# Patient Record
Sex: FEMALE | Race: WHITE
[De-identification: names, ages, dates, MRNs, and addresses within clinical notes are randomized per-mention and may not be internally consistent; named-entity substitution may affect disease eponyms.]

---

## 2017-05-16 ENCOUNTER — HOSPITAL ENCOUNTER (EMERGENCY)
Dept: HOSPITAL 14 - H.ER | Age: 22
Discharge: HOME | End: 2017-05-16
Payer: MEDICAID

## 2017-05-16 VITALS
RESPIRATION RATE: 16 BRPM | TEMPERATURE: 97.9 F | SYSTOLIC BLOOD PRESSURE: 119 MMHG | HEART RATE: 69 BPM | OXYGEN SATURATION: 100 % | DIASTOLIC BLOOD PRESSURE: 68 MMHG

## 2017-05-16 VITALS — BODY MASS INDEX: 24.7 KG/M2

## 2017-05-16 DIAGNOSIS — K29.70: Primary | ICD-10-CM

## 2017-05-16 DIAGNOSIS — N18.9: ICD-10-CM

## 2017-05-16 DIAGNOSIS — F41.9: ICD-10-CM

## 2017-05-16 DIAGNOSIS — R11.10: ICD-10-CM

## 2017-05-16 LAB
BACTERIA #/AREA URNS HPF: (no result) /[HPF]
BILIRUB UR-MCNC: NEGATIVE MG/DL
COLOR UR: (no result)
GLUCOSE UR STRIP-MCNC: (no result) MG/DL
KETONES UR STRIP-MCNC: NEGATIVE MG/DL
LEUKOCYTE ESTERASE UR-ACNC: (no result) LEU/UL
PH UR STRIP: 6 [PH] (ref 5–8)
PROT UR STRIP-MCNC: 30 MG/DL
RBC # UR STRIP: (no result) /UL
RBC #/AREA URNS HPF: 3 /HPF (ref 0–3)
SP GR UR STRIP: 1.02 (ref 1–1.03)
UROBILINOGEN UR-MCNC: (no result) MG/DL (ref 0.2–1)
WBC #/AREA URNS HPF: 10 /HPF (ref 0–5)

## 2017-07-04 ENCOUNTER — HOSPITAL ENCOUNTER (EMERGENCY)
Dept: HOSPITAL 31 - C.ER | Age: 22
Discharge: HOME | End: 2017-07-04
Payer: MEDICAID

## 2017-07-04 VITALS
SYSTOLIC BLOOD PRESSURE: 122 MMHG | DIASTOLIC BLOOD PRESSURE: 78 MMHG | HEART RATE: 72 BPM | TEMPERATURE: 98.5 F | RESPIRATION RATE: 20 BRPM

## 2017-07-04 VITALS — OXYGEN SATURATION: 100 %

## 2017-07-04 VITALS — BODY MASS INDEX: 24.7 KG/M2

## 2017-07-04 DIAGNOSIS — F31.9: ICD-10-CM

## 2017-07-04 DIAGNOSIS — F41.9: Primary | ICD-10-CM

## 2017-07-04 LAB
ALBUMIN SERPL-MCNC: 4.3 G/DL (ref 3.5–5)
ALBUMIN/GLOB SERPL: 1.3 {RATIO} (ref 1–2.1)
ALT SERPL-CCNC: 13 U/L (ref 9–52)
APAP SERPL-MCNC: < 10 UG/ML (ref 10–30)
AST SERPL-CCNC: 19 U/L (ref 14–36)
BACTERIA #/AREA URNS HPF: (no result) /[HPF]
BASOPHILS # BLD AUTO: 0 K/UL (ref 0–0.2)
BASOPHILS NFR BLD: 0.5 % (ref 0–2)
BILIRUB UR-MCNC: NEGATIVE MG/DL
BUN SERPL-MCNC: 8 MG/DL (ref 7–17)
CALCIUM SERPL-MCNC: 9.3 MG/DL (ref 8.6–10.4)
EOSINOPHIL # BLD AUTO: 0.2 K/UL (ref 0–0.7)
EOSINOPHIL NFR BLD: 1.8 % (ref 0–4)
ERYTHROCYTE [DISTWIDTH] IN BLOOD BY AUTOMATED COUNT: 12.6 % (ref 11.5–14.5)
GFR NON-AFRICAN AMERICAN: > 60
GLUCOSE UR STRIP-MCNC: NORMAL MG/DL
HCG,QUALITATIVE URINE: NEGATIVE
HGB BLD-MCNC: 12.9 G/DL (ref 11–16)
LEUKOCYTE ESTERASE UR-ACNC: (no result) LEU/UL
LYMPHOCYTES # BLD AUTO: 2.8 K/UL (ref 1–4.3)
LYMPHOCYTES NFR BLD AUTO: 31.2 % (ref 20–40)
MCH RBC QN AUTO: 28.6 PG (ref 27–31)
MCHC RBC AUTO-ENTMCNC: 33.1 G/DL (ref 33–37)
MCV RBC AUTO: 86.4 FL (ref 81–99)
MONOCYTES # BLD: 0.5 K/UL (ref 0–0.8)
MONOCYTES NFR BLD: 5 % (ref 0–10)
NEUTROPHILS # BLD: 5.5 K/UL (ref 1.8–7)
NEUTROPHILS NFR BLD AUTO: 61.5 % (ref 50–75)
NRBC BLD AUTO-RTO: 0.1 % (ref 0–2)
PH UR STRIP: 6 [PH] (ref 5–8)
PLATELET # BLD: 360 K/UL (ref 130–400)
PMV BLD AUTO: 8.9 FL (ref 7.2–11.7)
PROT UR STRIP-MCNC: (no result) MG/DL
RBC # BLD AUTO: 4.51 MIL/UL (ref 3.8–5.2)
RBC # UR STRIP: (no result) /UL
SALICYLATE: < 1 MG/DL 1
SP GR UR STRIP: 1.02 (ref 1–1.03)
SQUAMOUS EPITHIAL: 15 /HPF (ref 0–5)
URINE NITRATE: NEGATIVE
UROBILINOGEN UR-MCNC: NORMAL MG/DL (ref 0.2–1)
WBC # BLD AUTO: 9 K/UL (ref 4.8–10.8)

## 2017-07-04 NOTE — C.PDOC
History Of Present Illness


21 yr old female with history of bipolar disorder and has been off Litium for a 

while, presents to  the ER stating she is not able to sleep or eat for many 

days. Denies fever, chest pain, SOB, nausea, vomiting, headache, weakness or 

numbness. Denies suicidal ideation or homicidal ideation. 


Time Seen by Provider: 07/04/17 18:23


Chief Complaint (Nursing): Psychiatric Evaluation


History Per: Patient


History/Exam Limitations: no limitations


Onset/Duration Of Symptoms: Days (Many days)


Current Symptoms Are (Timing): Still Present


Associated Symptoms: denies: Suicidal Thoughts





Past Medical History


Reviewed: Historical Data, Nursing Documentation, Vital Signs


Vital Signs: 


 Last Vital Signs











Temp  98.2 F   07/04/17 18:04


 


Pulse  88   07/04/17 18:04


 


Resp  16   07/04/17 18:04


 


BP  131/82   07/04/17 18:04


 


Pulse Ox  100   07/04/17 18:41














- Medical History


PMH: Anxiety, Bipolar Disorder, Chronic Kidney Disease (hx of kidney infections)





- [facility] Procedures








NEBULIZER THERAPY (08/10/15)








Family History: States: No Known Family Hx





- Social History


Hx Tobacco Use: Yes


Hx Alcohol Use: No


Hx Substance Use: No





- Immunization History


Hx Tetanus Toxoid Vaccination: No


Hx Influenza Vaccination: No


Hx Pneumococcal Vaccination: No





Review Of Systems


Except As Marked, All Systems Reviewed And Found Negative.


Constitutional: Negative for: Fever


Cardiovascular: Negative for: Chest Pain


Respiratory: Negative for: Shortness of Breath


Gastrointestinal: Negative for: Nausea, Vomiting


Neurological: Negative for: Weakness, Numbness, Headache





Physical Exam





- Physical Exam


Appears: Non-toxic, Other ((+) Anxious by coopertive. )


Skin: Warm, Dry, No Rash


Head: Atraumatic, Normacephalic


Chest: Symmetrical, No Tenderness


Cardiovascular: Rhythm Regular, No Murmur


Respiratory: Normal Breath Sounds, No Stridor, No Wheezing


Extremity: Normal ROM, No Swelling


Neurological/Psych: Oriented x3, Normal Speech, Normal Motor





ED Course And Treatment





- Laboratory Results


Result Diagrams: 


 07/04/17 18:38





 07/04/17 18:38


Lab Interpretation: Abnormal (Few wbc's in urine)


Urine Pregnancy POC: Negative


O2 Sat by Pulse Oximetry: 100


Reevaluation Time: 20:15


Reassessment Condition: Improved





- Physician Consult Information


Outcome Of Conversation: 2015: d/w Crisis- ok to d/c and opt f/u. asks to Rx a 

few xanax 0.25 po for PRN use.  will see in AM





Medical Decision Making


Medical Decision Making: 


PLAN:


* Acetaminophen 


* Alcohol Serum 


* Drug Screen 


* CBC


* CMP


* HCG


* Urinalysis 








poorly controlled Bipolar, no suicidal ideation/HI





Disposition


Doctor Will See Patient In The: Office


Counseled Patient/Family Regarding: Studies Performed, Diagnosis





- Disposition


Disposition: HOME/ ROUTINE


Disposition Time: 20:15


Condition: GOOD





- Clinical Impression


Clinical Impression: 


 Anxiety, Manic bipolar I disorder








- Scribe Statement


The provider has reviewed the documentation as recorded by the Sushantibcarla Johnson


Provider Attestation: 


All medical record entries made by the Staci were at my direction and 

personally dictated by me. I have reviewed the chart and agree that the record 

accurately reflects my personal performance of the history, physical exam, 

medical decision making, and the department course for this patient. I have 

also personally directed, reviewed, and agree with the discharge instructions 

and disposition.

## 2017-09-10 ENCOUNTER — HOSPITAL ENCOUNTER (EMERGENCY)
Dept: HOSPITAL 14 - H.ER | Age: 22
Discharge: HOME | End: 2017-09-10
Payer: MEDICAID

## 2017-09-10 VITALS
HEART RATE: 66 BPM | RESPIRATION RATE: 18 BRPM | DIASTOLIC BLOOD PRESSURE: 77 MMHG | TEMPERATURE: 98.7 F | OXYGEN SATURATION: 100 % | SYSTOLIC BLOOD PRESSURE: 144 MMHG

## 2017-09-10 VITALS — BODY MASS INDEX: 24.7 KG/M2

## 2017-09-10 DIAGNOSIS — A63.0: Primary | ICD-10-CM

## 2017-10-02 ENCOUNTER — HOSPITAL ENCOUNTER (EMERGENCY)
Dept: HOSPITAL 14 - H.ER | Age: 22
Discharge: HOME | End: 2017-10-02
Payer: COMMERCIAL

## 2017-10-02 VITALS
RESPIRATION RATE: 18 BRPM | OXYGEN SATURATION: 100 % | TEMPERATURE: 98.4 F | DIASTOLIC BLOOD PRESSURE: 80 MMHG | SYSTOLIC BLOOD PRESSURE: 128 MMHG | HEART RATE: 68 BPM

## 2017-10-02 VITALS — BODY MASS INDEX: 24.7 KG/M2

## 2017-10-02 DIAGNOSIS — L25.9: Primary | ICD-10-CM

## 2019-05-23 ENCOUNTER — HOSPITAL ENCOUNTER (EMERGENCY)
Dept: HOSPITAL 14 - H.ER | Age: 24
Discharge: HOME | End: 2019-05-23
Payer: MEDICAID

## 2019-05-23 VITALS
SYSTOLIC BLOOD PRESSURE: 124 MMHG | OXYGEN SATURATION: 100 % | RESPIRATION RATE: 18 BRPM | TEMPERATURE: 99.1 F | HEART RATE: 77 BPM | DIASTOLIC BLOOD PRESSURE: 79 MMHG

## 2019-05-23 VITALS — BODY MASS INDEX: 24.7 KG/M2

## 2019-05-23 DIAGNOSIS — Z86.59: ICD-10-CM

## 2019-05-23 DIAGNOSIS — Z87.891: ICD-10-CM

## 2019-05-23 DIAGNOSIS — H10.9: Primary | ICD-10-CM

## 2019-05-23 DIAGNOSIS — N18.9: ICD-10-CM

## 2022-01-10 ENCOUNTER — NEW REFERRAL (OUTPATIENT)
Dept: URBAN - METROPOLITAN AREA CLINIC 73 | Facility: CLINIC | Age: 27
End: 2022-01-10

## 2022-01-10 DIAGNOSIS — H33.011: ICD-10-CM

## 2022-01-10 DIAGNOSIS — H52.13: ICD-10-CM

## 2022-01-10 DIAGNOSIS — H43.393: ICD-10-CM

## 2022-01-10 DIAGNOSIS — H35.462: ICD-10-CM

## 2022-01-10 PROCEDURE — 92134 CPTRZ OPH DX IMG PST SGM RTA: CPT

## 2022-01-10 PROCEDURE — 99244 OFF/OP CNSLTJ NEW/EST MOD 40: CPT

## 2022-01-10 PROCEDURE — 92201 OPSCPY EXTND RTA DRAW UNI/BI: CPT

## 2022-01-10 ASSESSMENT — VISUAL ACUITY
OS_CC: 20/25+2
OD_CC: 20/25+2

## 2022-01-10 ASSESSMENT — TONOMETRY
OS_IOP_MMHG: 18
OD_IOP_MMHG: 15

## 2022-01-11 ENCOUNTER — SURGERY/PROCEDURE (OUTPATIENT)
Dept: URBAN - METROPOLITAN AREA MEDICAL CENTER 2 | Facility: MEDICAL CENTER | Age: 27
End: 2022-01-11

## 2022-01-11 DIAGNOSIS — H33.021: ICD-10-CM

## 2022-01-11 PROCEDURE — 67107 REPAIR DETACHED RETINA: CPT

## 2022-01-12 ENCOUNTER — 1 DAY POST-OP (OUTPATIENT)
Dept: URBAN - METROPOLITAN AREA CLINIC 73 | Facility: CLINIC | Age: 27
End: 2022-01-12

## 2022-01-12 DIAGNOSIS — H33.011: ICD-10-CM

## 2022-01-12 DIAGNOSIS — H35.411: ICD-10-CM

## 2022-01-12 PROCEDURE — 99024 POSTOP FOLLOW-UP VISIT: CPT

## 2022-01-12 PROCEDURE — 92201 OPSCPY EXTND RTA DRAW UNI/BI: CPT

## 2022-01-12 ASSESSMENT — TONOMETRY: OD_IOP_MMHG: 20

## 2022-01-12 ASSESSMENT — VISUAL ACUITY
OD_CC: CF 6FT
OS_CC: 20/25

## 2022-01-19 ENCOUNTER — 1 WEEK POST-OP (OUTPATIENT)
Dept: URBAN - METROPOLITAN AREA CLINIC 73 | Facility: CLINIC | Age: 27
End: 2022-01-19

## 2022-01-19 DIAGNOSIS — H43.392: ICD-10-CM

## 2022-01-19 DIAGNOSIS — H35.462: ICD-10-CM

## 2022-01-19 DIAGNOSIS — H33.011: ICD-10-CM

## 2022-01-19 DIAGNOSIS — H35.411: ICD-10-CM

## 2022-01-19 PROCEDURE — 92201 OPSCPY EXTND RTA DRAW UNI/BI: CPT

## 2022-01-19 PROCEDURE — 99024 POSTOP FOLLOW-UP VISIT: CPT

## 2022-01-19 ASSESSMENT — VISUAL ACUITY
OD_SC: 5/200
OS_SC: 20/25

## 2022-01-19 ASSESSMENT — TONOMETRY: OD_IOP_MMHG: 12

## 2022-02-11 ENCOUNTER — POST-OP CHECK (OUTPATIENT)
Dept: URBAN - METROPOLITAN AREA CLINIC 73 | Facility: CLINIC | Age: 27
End: 2022-02-11

## 2022-02-11 DIAGNOSIS — T15.01XD: ICD-10-CM

## 2022-02-11 DIAGNOSIS — H33.011: ICD-10-CM

## 2022-02-11 DIAGNOSIS — H35.411: ICD-10-CM

## 2022-02-11 PROCEDURE — 65222 REMOVE FOREIGN BODY FROM EYE: CPT

## 2022-02-11 PROCEDURE — 99024 POSTOP FOLLOW-UP VISIT: CPT

## 2022-02-11 PROCEDURE — 92134 CPTRZ OPH DX IMG PST SGM RTA: CPT

## 2022-02-11 PROCEDURE — 92201 OPSCPY EXTND RTA DRAW UNI/BI: CPT

## 2022-02-11 ASSESSMENT — TONOMETRY
OS_IOP_MMHG: 23
OD_IOP_MMHG: 23
OS_IOP_MMHG: 24

## 2022-02-11 ASSESSMENT — VISUAL ACUITY
OD_SC: 5/200
OS_CC: 20/20-1

## 2022-08-01 ENCOUNTER — EMERGENCY (EMERGENCY)
Facility: HOSPITAL | Age: 27
LOS: 0 days | Discharge: HOME | End: 2022-08-01
Attending: EMERGENCY MEDICINE | Admitting: EMERGENCY MEDICINE

## 2022-08-01 VITALS
RESPIRATION RATE: 18 BRPM | TEMPERATURE: 97 F | HEIGHT: 62 IN | SYSTOLIC BLOOD PRESSURE: 137 MMHG | DIASTOLIC BLOOD PRESSURE: 71 MMHG | WEIGHT: 134.92 LBS | HEART RATE: 72 BPM | OXYGEN SATURATION: 100 %

## 2022-08-01 DIAGNOSIS — R10.30 LOWER ABDOMINAL PAIN, UNSPECIFIED: ICD-10-CM

## 2022-08-01 DIAGNOSIS — Z3A.09 9 WEEKS GESTATION OF PREGNANCY: ICD-10-CM

## 2022-08-01 DIAGNOSIS — O20.0 THREATENED ABORTION: ICD-10-CM

## 2022-08-01 DIAGNOSIS — O34.81 MATERNAL CARE FOR OTHER ABNORMALITIES OF PELVIC ORGANS, FIRST TRIMESTER: ICD-10-CM

## 2022-08-01 DIAGNOSIS — N83.11 CORPUS LUTEUM CYST OF RIGHT OVARY: ICD-10-CM

## 2022-08-01 DIAGNOSIS — O20.9 HEMORRHAGE IN EARLY PREGNANCY, UNSPECIFIED: ICD-10-CM

## 2022-08-01 LAB
ALBUMIN SERPL ELPH-MCNC: 4.7 G/DL — SIGNIFICANT CHANGE UP (ref 3.5–5.2)
ALP SERPL-CCNC: 50 U/L — SIGNIFICANT CHANGE UP (ref 30–115)
ALT FLD-CCNC: 8 U/L — SIGNIFICANT CHANGE UP (ref 0–41)
ANION GAP SERPL CALC-SCNC: 13 MMOL/L — SIGNIFICANT CHANGE UP (ref 7–14)
APPEARANCE UR: CLEAR — SIGNIFICANT CHANGE UP
AST SERPL-CCNC: 15 U/L — SIGNIFICANT CHANGE UP (ref 0–41)
BACTERIA # UR AUTO: ABNORMAL
BASOPHILS # BLD AUTO: 0.03 K/UL — SIGNIFICANT CHANGE UP (ref 0–0.2)
BASOPHILS NFR BLD AUTO: 0.3 % — SIGNIFICANT CHANGE UP (ref 0–1)
BILIRUB SERPL-MCNC: <0.2 MG/DL — SIGNIFICANT CHANGE UP (ref 0.2–1.2)
BILIRUB UR-MCNC: NEGATIVE — SIGNIFICANT CHANGE UP
BLD GP AB SCN SERPL QL: SIGNIFICANT CHANGE UP
BUN SERPL-MCNC: 10 MG/DL — SIGNIFICANT CHANGE UP (ref 10–20)
CALCIUM SERPL-MCNC: 9.7 MG/DL — SIGNIFICANT CHANGE UP (ref 8.5–10.1)
CHLORIDE SERPL-SCNC: 100 MMOL/L — SIGNIFICANT CHANGE UP (ref 98–110)
CO2 SERPL-SCNC: 24 MMOL/L — SIGNIFICANT CHANGE UP (ref 17–32)
COLOR SPEC: YELLOW — SIGNIFICANT CHANGE UP
CREAT SERPL-MCNC: 0.5 MG/DL — LOW (ref 0.7–1.5)
DIFF PNL FLD: ABNORMAL
EGFR: 133 ML/MIN/1.73M2 — SIGNIFICANT CHANGE UP
EOSINOPHIL # BLD AUTO: 0.16 K/UL — SIGNIFICANT CHANGE UP (ref 0–0.7)
EOSINOPHIL NFR BLD AUTO: 1.5 % — SIGNIFICANT CHANGE UP (ref 0–8)
EPI CELLS # UR: 3 /HPF — SIGNIFICANT CHANGE UP (ref 0–5)
GLUCOSE SERPL-MCNC: 81 MG/DL — SIGNIFICANT CHANGE UP (ref 70–99)
GLUCOSE UR QL: NEGATIVE — SIGNIFICANT CHANGE UP
HCG SERPL-ACNC: HIGH MIU/ML
HCT VFR BLD CALC: 35.5 % — LOW (ref 37–47)
HGB BLD-MCNC: 12 G/DL — SIGNIFICANT CHANGE UP (ref 12–16)
HYALINE CASTS # UR AUTO: 1 /LPF — SIGNIFICANT CHANGE UP (ref 0–7)
IMM GRANULOCYTES NFR BLD AUTO: 0.3 % — SIGNIFICANT CHANGE UP (ref 0.1–0.3)
INR BLD: 1.1 RATIO — SIGNIFICANT CHANGE UP (ref 0.65–1.3)
KETONES UR-MCNC: SIGNIFICANT CHANGE UP
LEUKOCYTE ESTERASE UR-ACNC: ABNORMAL
LYMPHOCYTES # BLD AUTO: 28.5 % — SIGNIFICANT CHANGE UP (ref 20.5–51.1)
LYMPHOCYTES # BLD AUTO: 3.12 K/UL — SIGNIFICANT CHANGE UP (ref 1.2–3.4)
MCHC RBC-ENTMCNC: 28.4 PG — SIGNIFICANT CHANGE UP (ref 27–31)
MCHC RBC-ENTMCNC: 33.8 G/DL — SIGNIFICANT CHANGE UP (ref 32–37)
MCV RBC AUTO: 83.9 FL — SIGNIFICANT CHANGE UP (ref 81–99)
MONOCYTES # BLD AUTO: 0.65 K/UL — HIGH (ref 0.1–0.6)
MONOCYTES NFR BLD AUTO: 5.9 % — SIGNIFICANT CHANGE UP (ref 1.7–9.3)
NEUTROPHILS # BLD AUTO: 6.95 K/UL — HIGH (ref 1.4–6.5)
NEUTROPHILS NFR BLD AUTO: 63.5 % — SIGNIFICANT CHANGE UP (ref 42.2–75.2)
NITRITE UR-MCNC: NEGATIVE — SIGNIFICANT CHANGE UP
NRBC # BLD: 0 /100 WBCS — SIGNIFICANT CHANGE UP (ref 0–0)
PH UR: 6 — SIGNIFICANT CHANGE UP (ref 5–8)
PLATELET # BLD AUTO: 367 K/UL — SIGNIFICANT CHANGE UP (ref 130–400)
POTASSIUM SERPL-MCNC: 4.2 MMOL/L — SIGNIFICANT CHANGE UP (ref 3.5–5)
POTASSIUM SERPL-SCNC: 4.2 MMOL/L — SIGNIFICANT CHANGE UP (ref 3.5–5)
PROT SERPL-MCNC: 7.4 G/DL — SIGNIFICANT CHANGE UP (ref 6–8)
PROT UR-MCNC: SIGNIFICANT CHANGE UP
PROTHROM AB SERPL-ACNC: 12.6 SEC — SIGNIFICANT CHANGE UP (ref 9.95–12.87)
RBC # BLD: 4.23 M/UL — SIGNIFICANT CHANGE UP (ref 4.2–5.4)
RBC # FLD: 13.9 % — SIGNIFICANT CHANGE UP (ref 11.5–14.5)
RBC CASTS # UR COMP ASSIST: 9 /HPF — HIGH (ref 0–4)
SODIUM SERPL-SCNC: 137 MMOL/L — SIGNIFICANT CHANGE UP (ref 135–146)
SP GR SPEC: 1.02 — SIGNIFICANT CHANGE UP (ref 1.01–1.03)
UROBILINOGEN FLD QL: SIGNIFICANT CHANGE UP
WBC # BLD: 10.94 K/UL — HIGH (ref 4.8–10.8)
WBC # FLD AUTO: 10.94 K/UL — HIGH (ref 4.8–10.8)
WBC UR QL: 37 /HPF — HIGH (ref 0–5)

## 2022-08-01 PROCEDURE — 99285 EMERGENCY DEPT VISIT HI MDM: CPT

## 2022-08-01 PROCEDURE — 76830 TRANSVAGINAL US NON-OB: CPT | Mod: 26

## 2022-08-01 PROCEDURE — 99283 EMERGENCY DEPT VISIT LOW MDM: CPT

## 2022-08-01 RX ORDER — CLINDAMYCIN PHOSPHATE GEL USP, 1% 10 MG/G
1 GEL TOPICAL
Qty: 7 | Refills: 0
Start: 2022-08-01 | End: 2022-08-07

## 2022-08-01 NOTE — CONSULT NOTE ADULT - ASSESSMENT
25yo  @9w5d with LMP  with vaginal spotting, now resolved, likely 2/2 to friable cervix with reassuring fetal and maternal status.    -no acute OBGYN intervention  -recommend follow-up with Dr. Hills next week at appointment  -recommend tylenol only for pain  -continue abx prescribed outpatient for UTI  -bleeding precautions given  -dispo per ED    INCOMPLETE NOTE 25yo  @9w5d with LMP  with vaginal spotting, now resolved, likely 2/2 to friable cervix with reassuring fetal and maternal status.    -no acute OBGYN intervention  -f/u vaginitis  -recommend follow-up with Dr. Hills next week at appointment  -recommend tylenol only for pain  -continue abx prescribed outpatient for UTI  -bleeding precautions given  -dispo per ED    Dr. Donato and Dr. Espinosa aware    Incomplete note 27yo  @9w5d with LMP  with vaginal spotting, now resolved, likely 2/2 to friable cervix with reassuring fetal and maternal status.    -no acute OBGYN intervention  -f/u vaginitis  -recommend cleocin vaginal cream for 7 days (sent to pharmacy)  -recommend follow-up with Dr. Hills next week at scheduled appointment  -recommend tylenol only for pain  -continue abx prescribed outpatient for UTI  -bleeding precautions given  -dispo per ED    Dr. Donato and Dr. Espinosa aware   25yo  @9w5d with LMP  with vaginal spotting, now resolved, likely 2/2 to friable cervix with reassuring fetal and maternal status.    -no acute OBGYN intervention  -historical type and screen on file - B pos, no need for rhogam  -f/u vaginitis  -recommend cleocin vaginal cream for 7 days (sent to pharmacy)  -recommend follow-up with Dr. Hills next week at scheduled appointment  -recommend tylenol only for pain  -continue abx prescribed outpatient for UTI  -bleeding precautions given  -dispo per ED    Dr. Donato and Dr. Espinosa aware

## 2022-08-01 NOTE — ED PROVIDER NOTE - OBJECTIVE STATEMENT
27 y/o F  @ 9 weeks pregnant presenting for vaginal bleeding that started suddenly overnight. A/w lower abdominal cramping that she states feels like contractions. States vaginal bleeding over past few hours was enough to soak through underwear and pants but has not used any pad/tampon. Did not take anything for pain. Pt has had ob visits, US confirmed intrauterine pregnancy. LMP . No recent fevers, nausea/vomiting, dysuria, back pain.

## 2022-08-01 NOTE — CONSULT NOTE ADULT - SUBJECTIVE AND OBJECTIVE BOX
PGY 2 Note    Chief Complaint: vaginal bleeding    HPI: 27yo  @9w5d with LMP  presents for vaginal bleeding since 1am. She denies saturating pads or passing clots. She also endorses cramping for the past 2 hours 3/10 intensity. Of note, she endorses having sexual intercourse with her  yesterday evening. patient is currently being treated with cephalexin for UTI by Dr. Hills (OBGYN). She denies back pain. Denies fevers, chills, SOB, CP, nausea, vomiting, diarrhea, constipation, or hematuria.     Ob/Gyn History:  , NSVDx2, FT, largest [id number], no complications      LMP - 22                  Cycle Length - regular  Endorses history of ovarian cysts, uterine fibroids. Denies abnormal paps or STIs    No Known Allergies    PAST MEDICAL & SURGICAL HISTORY: Denies      FAMILY HISTORY: denies pertinent      SOCIAL HISTORY: Denies cigarette use, alcohol use, or illicit drug use    Vital Signs Last 24 Hrs  T(F): 96.9 (01 Aug 2022 01:33), Max: 96.9 (01 Aug 2022 01:33)  HR: 72 (01 Aug 2022 01:33) (72 - 72)  BP: 137/71 (01 Aug 2022 01:33) (137/71 - 137/71)  RR: 18 (01 Aug 2022 01:33) (18 - 18)  Height (cm): 157.5 (22 @ 01:33)  Weight (kg): 61.2 (22 @ 01:33)  BMI (kg/m2): 24.7 (22 @ 01:33)  BSA (m2): 1.62 (22 @ 01:33)    General Appearance - AAOx3, NAD  Heart - S1S2 regular rate and rhythm  Lung - CTA Bilaterally  Abdomen - Soft, nontender, nondistended, no rebound, no rigidity, no guarding, bowel sounds present. No CVA tenderness.    GYN/Pelvis:    Labia Majora - Normal  Labia Minora - Normal  Clitoris - Normal  Urethra - Normal  Vagina - Normal. no bleeding  Cervix - Normal, closed, friable cervix noted    Uterus:  Size - Normal  Tenderness - None  Mass - None  Freely mobile    Adnexa:  Masses - None  Tenderness - None    LABS:                        12.0   10.94 )-----------( 367      ( 01 Aug 2022 05:12 )             35.5     HCG Quantitative, Serum: 17270.0 mIU/mL (22 @ 05:12)          137  |  100  |  10  ----------------------------<  81  4.2   |  24  |  0.5<L>    Ca    9.7      01 Aug 2022 05:12    TPro  7.4  /  Alb  4.7  /  TBili  <0.2  /  DBili  x   /  AST  15  /  ALT  8   /  AlkPhos  50      PT/INR - ( 01 Aug 2022 05:12 )   PT: 12.60 sec;   INR: 1.10 ratio           Urinalysis Basic - ( 01 Aug 2022 06:42 )    Color: Yellow / Appearance: Clear / S.024 / pH: x  Gluc: x / Ketone: Trace  / Bili: Negative / Urobili: <2 mg/dL   Blood: x / Protein: Trace / Nitrite: Negative   Leuk Esterase: Large / RBC: x / WBC x   Sq Epi: x / Non Sq Epi: x / Bacteria: x      RADIOLOGY & ADDITIONAL STUDIES:  < from: US Transvaginal (22 @ 06:00) >   US TRANSVAGINAL                          PROCEDURE DATE:  2022          INTERPRETATION:  CLINICAL INFORMATION: Vaginal bleeding in pregnancy.    LMP: 2022    COMPARISON: None.    TECHNIQUE:  Transabdominal pelvic sonogram only. Color and Spectral Doppler was   performed.    FINDINGS:    UTERUS: There is a single intrauterine gestational sac. A yolk sac is   identified. An embryo is present.     Mean sac diameter: 3.32 cm, corresponding to a gestational age of 8   weeks and 2 days.     Crown rump length: 2.13 cm, corresponding to a gestational age of 8   weeks and 5 days.     Embryonic heart rate: Regular rate and rhythm at 173 bpm.    RIGHT OVARY/ADNEXA: Ovarian size: 3.8 x 2.5 x 2.7 cm. Right corpus luteal   cyst.    LEFT OVARY/ADNEXA: Ovarian size: 3.0 x 1.0 x 3.2 cm. The left ovary is   unremarkable.    FLUID: There is no abnormal free fluid in the cul-de-sac.    IMPRESSION:    Single live intrauterine gestation at approximately 5 weeks and 5 days by    crown rump length.    Fetal heart rate of 173 bpm    --- End of Report ---    < end of copied text >  < from: US Transvaginal (22 @ 06:00) >   US TRANSVAGINAL                          PROCEDURE DATE:  2022          INTERPRETATION:  CLINICAL INFORMATION: Vaginal bleeding in pregnancy.    LMP: 2022    COMPARISON: None.    TECHNIQUE:  Transabdominal pelvic sonogram only. Color and Spectral Doppler was   performed.    FINDINGS:    UTERUS: There is a single intrauterine gestational sac. A yolk sac is   identified. An embryo is present.     Mean sac diameter: 3.32 cm, corresponding to a gestational age of 8   weeks and 2 days.     Crown rump length: 2.13 cm, corresponding to a gestational age of 8   weeks and 5 days.     Embryonic heart rate: Regular rate and rhythm at 173 bpm.    RIGHT OVARY/ADNEXA: Ovarian size: 3.8 x 2.5 x 2.7 cm. Right corpus luteal   cyst.    LEFT OVARY/ADNEXA: Ovarian size: 3.0 x 1.0 x 3.2 cm. The left ovary is   unremarkable.    FLUID: There is no abnormal free fluid in the cul-de-sac.    IMPRESSION:    Single live intrauterine gestation at approximately 5 weeks and 5 days by    crown rump length.    Fetal heart rate of 173 bpm    --- End of Report ---    < end of copied text >  < from: US Transvaginal (22 @ 06:00) >   US TRANSVAGINAL                          PROCEDURE DATE:  2022          INTERPRETATION:  CLINICAL INFORMATION: Vaginal bleeding in pregnancy.    LMP: 2022    COMPARISON: None.    TECHNIQUE:  Transabdominal pelvic sonogram only. Color and Spectral Doppler was   performed.    FINDINGS:    UTERUS: There is a single intrauterine gestational sac. A yolk sac is   identified. An embryo is present.     Mean sac diameter: 3.32 cm, corresponding to a gestational age of 8   weeks and 2 days.     Crown rump length: 2.13 cm, corresponding to a gestational age of 8   weeks and 5 days.     Embryonic heart rate: Regular rate and rhythm at 173 bpm.    RIGHT OVARY/ADNEXA: Ovarian size: 3.8 x 2.5 x 2.7 cm. Right corpus luteal   cyst.    LEFT OVARY/ADNEXA: Ovarian size: 3.0 x 1.0 x 3.2 cm. The left ovary is   unremarkable.    FLUID: There is no abnormal free fluid in the cul-de-sac.    IMPRESSION:    Single live intrauterine gestation at approximately 5 weeks and 5 days by    crown rump length.    Fetal heart rate of 173 bpm    --- End of Report ---    < end of copied text >     PGY 2 Note    Chief Complaint: vaginal bleeding    HPI: 27yo  @9w5d with LMP  presents for vaginal bleeding since 1am. She denies saturating pads or passing clots. She also endorses cramping for the past 2 hours 3/10 intensity. Of note, she endorses having sexual intercourse with her  yesterday evening. patient is currently being treated with cephalexin for UTI by Dr. Hills (OBGYN). She denies back pain. Denies fevers, chills, SOB, CP, nausea, vomiting, diarrhea, constipation, or hematuria.     Ob/Gyn History:  , NSVDx2, FT, largest [id number], no complications      LMP - 22                  Cycle Length - regular  Endorses history of ovarian cysts, uterine fibroids. Denies abnormal paps or STIs    No Known Allergies    PAST MEDICAL & SURGICAL HISTORY: Denies      FAMILY HISTORY: denies pertinent      SOCIAL HISTORY: Denies cigarette use, alcohol use, or illicit drug use    Vital Signs Last 24 Hrs  T(F): 96.9 (01 Aug 2022 01:33), Max: 96.9 (01 Aug 2022 01:33)  HR: 72 (01 Aug 2022 01:33) (72 - 72)  BP: 137/71 (01 Aug 2022 01:33) (137/71 - 137/71)  RR: 18 (01 Aug 2022 01:33) (18 - 18)  Height (cm): 157.5 (22 @ 01:33)  Weight (kg): 61.2 (22 @ 01:33)  BMI (kg/m2): 24.7 (22 @ 01:33)  BSA (m2): 1.62 (22 @ 01:33)    General Appearance - AAOx3, NAD  Heart - S1S2 regular rate and rhythm  Lung - CTA Bilaterally  Abdomen - Soft, nontender, nondistended, no rebound, no rigidity, no guarding, bowel sounds present. No CVA tenderness.  External genitalia: normal female genitalia  Vagina: rugaeted, no bleeding, no discharge  Cervix: Normal, closed, friable cervix noted  Uterus: 9 week sized, nontender, mobile  Adnexa: no masses, no tenderness    LABS:                        12.0   10.94 )-----------( 367      ( 01 Aug 2022 05:12 )             35.5     HCG Quantitative, Serum: 96055.0 mIU/mL (22 @ 05:12)          137  |  100  |  10  ----------------------------<  81  4.2   |  24  |  0.5<L>    Ca    9.7      01 Aug 2022 05:12    TPro  7.4  /  Alb  4.7  /  TBili  <0.2  /  DBili  x   /  AST  15  /  ALT  8   /  AlkPhos  50      PT/INR - ( 01 Aug 2022 05:12 )   PT: 12.60 sec;   INR: 1.10 ratio        Urinalysis Basic - ( 01 Aug 2022 06:42 )    Color: Yellow / Appearance: Clear / S.024 / pH: x  Gluc: x / Ketone: Trace  / Bili: Negative / Urobili: <2 mg/dL   Blood: x / Protein: Trace / Nitrite: Negative   Leuk Esterase: Large / RBC: x / WBC x   Sq Epi: x / Non Sq Epi: x / Bacteria: x      RADIOLOGY & ADDITIONAL STUDIES:  < from: US Transvaginal (22 @ 06:00) >   US TRANSVAGINAL                            INTERPRETATION:  CLINICAL INFORMATION: Vaginal bleeding in pregnancy.    LMP: 2022    COMPARISON: None.    TECHNIQUE:  Transabdominal pelvic sonogram only. Color and Spectral Doppler was   performed.    FINDINGS:    UTERUS: There is a single intrauterine gestational sac. A yolk sac is   identified. An embryo is present.     Mean sac diameter: 3.32 cm, corresponding to a gestational age of 8   weeks and 2 days.     Crown rump length: 2.13 cm, corresponding to a gestational age of 8   weeks and 5 days.     Embryonic heart rate: Regular rate and rhythm at 173 bpm.    RIGHT OVARY/ADNEXA: Ovarian size: 3.8 x 2.5 x 2.7 cm. Right corpus luteal   cyst.    LEFT OVARY/ADNEXA: Ovarian size: 3.0 x 1.0 x 3.2 cm. The left ovary is   unremarkable.    FLUID: There is no abnormal free fluid in the cul-de-sac.    IMPRESSION:    Single live intrauterine gestation at approximately 5 weeks and 5 days by    crown rump length.    Fetal heart rate of 173 bpm    --- End of Report ---    < end of copied text >

## 2022-08-01 NOTE — ED PROVIDER NOTE - NSFOLLOWUPINSTRUCTIONS_ED_ALL_ED_FT
Threatened Miscarriage    A threatened miscarriage occurs when you have vaginal bleeding during your first 20 weeks of pregnancy but the pregnancy has not ended. If you have vaginal bleeding during this time, your health care provider will do tests to make sure you are still pregnant. If the tests show you are still pregnant and the developing baby (fetus) inside your womb (uterus) is still growing, your condition is considered a threatened miscarriage.    A threatened miscarriage does not mean your pregnancy will end, but it does increase the risk of losing your pregnancy (complete miscarriage).    CAUSES  The cause of a threatened miscarriage is usually not known. If you go on to have a complete miscarriage, the most common cause is an abnormal number of chromosomes in the developing baby. Chromosomes are the structures inside cells that hold all your genetic material.    Some causes of vaginal bleeding that do not result in miscarriage include:    Having sex.  Having an infection.  Normal hormone changes of pregnancy.  Bleeding that occurs when an egg implants in your uterus.    RISK FACTORS  Risk factors for bleeding in early pregnancy include:    Obesity.  Smoking.  Drinking excessive amounts of alcohol or caffeine.  Recreational drug use.    SIGNS AND SYMPTOMS  Light vaginal bleeding.   Mild abdominal pain or cramps.     DIAGNOSIS  If you have bleeding with or without abdominal pain before 20 weeks of pregnancy, your health care provider will do tests to check whether you are still pregnant. One important test involves using sound waves and a computer (ultrasound) to create images of the inside of your uterus. Other tests include an internal exam of your vagina and uterus (pelvic exam) and measurement of your baby's heart rate.     You may be diagnosed with a threatened miscarriage if:    Ultrasound testing shows you are still pregnant.  Your baby's heart rate is strong.  A pelvic exam shows that the opening between your uterus and your vagina (cervix) is closed.  Your heart rate and blood pressure are stable.  Blood tests confirm you are still pregnant.    TREATMENT  No treatments have been shown to prevent a threatened miscarriage from going on to a complete miscarriage. However, the right home care is important.     HOME CARE INSTRUCTIONS  Make sure you keep all your appointments for prenatal care. This is very important.  Get plenty of rest.  Do not have sex or use tampons if you have vaginal bleeding.  Do not douche.  Do not smoke or use recreational drugs.   Do not drink alcohol.   Avoid caffeine.     SEEK MEDICAL CARE IF:  You have light vaginal bleeding or spotting while pregnant.   You have abdominal pain or cramping.   You have a fever.     SEEK IMMEDIATE MEDICAL CARE IF:  You have heavy vaginal bleeding.   You have blood clots coming from your vagina.   You have severe low back pain or abdominal cramps.   You have fever, chills, and severe abdominal pain.     MAKE SURE YOU:  Understand these instructions.  Will watch your condition.  Will get help right away if you are not doing well or get worse.    ADDITIONAL NOTES AND INSTRUCTIONS    Please follow up with your Primary MD in 24-48 hr.  Seek immediate medical care for any new/worsening signs or symptoms.    Urinary Tract Infection    A urinary tract infection (UTI) is an infection of any part of the urinary tract, which includes the kidneys, ureters, bladder, and urethra. Risk factors include ignoring your need to urinate, wiping back to front if female, being an uncircumcised male, and having diabetes or a weak immune system. Symptoms include frequent urination, pain or burning with urination, foul smelling urine, cloudy urine, pain in the lower abdomen, blood in the urine, and fever. If you were prescribed an antibiotic medicine, take it as told by your health care provider. Do not stop taking the antibiotic even if you start to feel better.    SEEK IMMEDIATE MEDICAL CARE IF YOU HAVE ANY OF THE FOLLOWING SYMPTOMS: severe back or abdominal pain, fever, inability to keep fluids or medicine down, dizziness/lightheadedness, or a change in mental status.    Please follow up with your OBGYN for your scheduled appointment.

## 2022-08-01 NOTE — ED ADULT NURSE NOTE - OBJECTIVE STATEMENT
Patient presents for vaginal bleeding and abdominal cramping.  States that she is 9 weeks pregnant.  Reports she has two previous live births.

## 2022-08-01 NOTE — ED PROVIDER NOTE - ATTENDING CONTRIBUTION TO CARE
Patient is c/o vaginal bleeding with lower abd cramping. Denies f/c/cp/sob. Patient is pregnant.   Vitals reviewed.   Patient is awake, alert, answering questions appropriately, appears comfortable and not in any distress.  Lungs: CTA, no wheezing, no crackles.  Abd: +BS, NT, ND, soft,   A/P: Vaginal bleeding/pelvic pain,   labs, US, OBGYN consult,   reevaluation.

## 2022-08-01 NOTE — ED PROVIDER NOTE - NS ED ROS FT
Constitutional: No fever  Eyes:  No visual changes, eye pain, or discharge.  ENMT:  No hearing changes, earpain, sore throat, runny nose, or difficulty swallowing  Cardiac:  No chest pain, SOB or edema. No chest pain with exertion.  Respiratory:  No cough or respiratory distress.   GI: +abd pain. No nausea, vomiting, diarrhea   : +vaginal bleeding. No dysuria, frequency or burning.  MS:  No myalgia, muscle weakness, joint pain or back pain.  Neuro:  No headache or weakness.  No LOC.  Skin:  No skin rash.

## 2022-08-01 NOTE — ED PROVIDER NOTE - PATIENT PORTAL LINK FT
You can access the FollowMyHealth Patient Portal offered by [facility] by registering at the following website: [email or web address]. By joining [facility]’s FollowMyHealth portal, you will also be able to view your health information using other applications (apps) compatible with our system.

## 2022-08-01 NOTE — ED PROVIDER NOTE - PHYSICAL EXAMINATION
CONSTITUTIONAL: Well-developed; well-nourished; in no acute distress.   SKIN: Warm, dry  HEAD: Normocephalic; atraumatic  EYES: PERRL, EOMI, normal sclera and conjunctiva   ENT: No nasal discharge; airway clear. MMM  NECK: Supple; non tender.  CARD:  Regular rate and rhythm. Normal S1, S2  RESP: No increased WOB. CTA b/l without wheezes, crackles, rhonchi  ABD: Normoactive BS. Soft, nondistended. Minimal suprapubic tenderness. No CVA tenderness.  EXT: Normal ROM. No edema.  LYMPH: No acute cervical adenopathy.  NEURO: Alert, oriented, grossly unremarkable  PSYCH: Cooperative, appropriate.

## 2022-08-01 NOTE — ED PROVIDER NOTE - TOBACCO USE
Detail Level: Zone Never smoker Detail Level: Simple Detail Level: Generalized Detail Level: Detailed

## 2022-08-01 NOTE — ED PROVIDER NOTE - PROGRESS NOTE DETAILS
BK: Patient RH negative on chart review, discussed with OB, no need for rhogam. Patient has been on cephalexin for ~1 week, discussed possibility of starting new antibiotics today, but patient would rather continue with current antibiotics and wait for urine culture. Patient has follow up with her OB in 1 week and would prefer to see her outpatient. Discharge precautions given.

## 2022-08-01 NOTE — ED ADULT TRIAGE NOTE - CHIEF COMPLAINT QUOTE
I'm pregnant, 9 weeks and I'm bleeding , spotting. Now I'm starting to feel cramping  - patient  Patient's third pregnancy

## 2022-08-02 LAB
CULTURE RESULTS: SIGNIFICANT CHANGE UP
SPECIMEN SOURCE: SIGNIFICANT CHANGE UP

## 2022-08-04 LAB
A VAGINAE DNA VAG QL NAA+PROBE: SIGNIFICANT CHANGE UP
BVAB2 DNA VAG QL NAA+PROBE: SIGNIFICANT CHANGE UP
C ALBICANS DNA VAG QL NAA+PROBE: NEGATIVE — SIGNIFICANT CHANGE UP
C GLABRATA DNA VAG QL NAA+PROBE: NEGATIVE — SIGNIFICANT CHANGE UP
C KRUSEI DNA VAG QL NAA+PROBE: NEGATIVE — SIGNIFICANT CHANGE UP
C LUSITANIAE DNA VAG QL NAA+PROBE: NEGATIVE — SIGNIFICANT CHANGE UP
C TRACH RRNA SPEC QL NAA+PROBE: NEGATIVE — SIGNIFICANT CHANGE UP
MEGA1 DNA VAG QL NAA+PROBE: SIGNIFICANT CHANGE UP
N GONORRHOEA RRNA SPEC QL NAA+PROBE: NEGATIVE — SIGNIFICANT CHANGE UP
T VAGINALIS RRNA SPEC QL NAA+PROBE: NEGATIVE — SIGNIFICANT CHANGE UP

## 2022-10-19 NOTE — ED ADULT NURSE NOTE - NSFALLRSKASSESASSIST_ED_ALL_ED
5-Fu Pregnancy And Lactation Text: This medication is Pregnancy Category X and contraindicated in pregnancy and in women who may become pregnant. It is unknown if this medication is excreted in breast milk. no

## 2023-08-31 ENCOUNTER — INPATIENT (INPATIENT)
Facility: HOSPITAL | Age: 28
LOS: 1 days | Discharge: HOME | DRG: 540 | End: 2023-09-02
Attending: OBSTETRICS & GYNECOLOGY | Admitting: OBSTETRICS & GYNECOLOGY
Payer: MEDICAID

## 2023-08-31 VITALS
RESPIRATION RATE: 18 BRPM | WEIGHT: 293 LBS | TEMPERATURE: 98 F | HEART RATE: 77 BPM | SYSTOLIC BLOOD PRESSURE: 120 MMHG | HEIGHT: 63 IN | DIASTOLIC BLOOD PRESSURE: 65 MMHG

## 2023-08-31 DIAGNOSIS — Z98.890 OTHER SPECIFIED POSTPROCEDURAL STATES: Chronic | ICD-10-CM

## 2023-08-31 PROBLEM — Z78.9 OTHER SPECIFIED HEALTH STATUS: Chronic | Status: ACTIVE | Noted: 2022-08-01

## 2023-08-31 LAB
AMPHET UR-MCNC: NEGATIVE — SIGNIFICANT CHANGE UP
APPEARANCE UR: ABNORMAL
APTT BLD: 23.6 SEC — CRITICAL LOW (ref 27–39.2)
BACTERIA # UR AUTO: ABNORMAL /HPF
BARBITURATES UR SCN-MCNC: NEGATIVE — SIGNIFICANT CHANGE UP
BASOPHILS # BLD AUTO: 0 K/UL — SIGNIFICANT CHANGE UP (ref 0–0.2)
BASOPHILS # BLD AUTO: 0.02 K/UL — SIGNIFICANT CHANGE UP (ref 0–0.2)
BASOPHILS NFR BLD AUTO: 0 % — SIGNIFICANT CHANGE UP (ref 0–1)
BASOPHILS NFR BLD AUTO: 0.2 % — SIGNIFICANT CHANGE UP (ref 0–1)
BENZODIAZ UR-MCNC: NEGATIVE — SIGNIFICANT CHANGE UP
BILIRUB UR-MCNC: ABNORMAL
BLD GP AB SCN SERPL QL: SIGNIFICANT CHANGE UP
BUPRENORPHINE SCREEN, URINE RESULT: NEGATIVE — SIGNIFICANT CHANGE UP
CAST: 1 /LPF — SIGNIFICANT CHANGE UP (ref 0–4)
COCAINE METAB.OTHER UR-MCNC: NEGATIVE — SIGNIFICANT CHANGE UP
COLOR SPEC: ABNORMAL
DIFF PNL FLD: NEGATIVE — SIGNIFICANT CHANGE UP
EOSINOPHIL # BLD AUTO: 0 K/UL — SIGNIFICANT CHANGE UP (ref 0–0.7)
EOSINOPHIL # BLD AUTO: 0.11 K/UL — SIGNIFICANT CHANGE UP (ref 0–0.7)
EOSINOPHIL NFR BLD AUTO: 0 % — SIGNIFICANT CHANGE UP (ref 0–8)
EOSINOPHIL NFR BLD AUTO: 1.1 % — SIGNIFICANT CHANGE UP (ref 0–8)
FENTANYL UR QL: NEGATIVE — SIGNIFICANT CHANGE UP
FIBRINOGEN PPP-MCNC: 461 MG/DL — SIGNIFICANT CHANGE UP (ref 204.4–570.6)
GLUCOSE UR QL: NEGATIVE MG/DL — SIGNIFICANT CHANGE UP
HCT VFR BLD CALC: 39.5 % — SIGNIFICANT CHANGE UP (ref 37–47)
HCT VFR BLD CALC: 40.3 % — SIGNIFICANT CHANGE UP (ref 37–47)
HGB BLD-MCNC: 13.6 G/DL — SIGNIFICANT CHANGE UP (ref 12–16)
HGB BLD-MCNC: 13.6 G/DL — SIGNIFICANT CHANGE UP (ref 12–16)
HIV 1 & 2 AB SERPL IA.RAPID: SIGNIFICANT CHANGE UP
HYALINE CASTS # UR AUTO: 1 /LPF — SIGNIFICANT CHANGE UP (ref 0–4)
IMM GRANULOCYTES NFR BLD AUTO: 0.3 % — SIGNIFICANT CHANGE UP (ref 0.1–0.3)
INR BLD: 0.91 RATIO — SIGNIFICANT CHANGE UP (ref 0.65–1.3)
KETONES UR-MCNC: 15 MG/DL
L&D DRUG SCREEN, URINE: SIGNIFICANT CHANGE UP
LEUKOCYTE ESTERASE UR-ACNC: ABNORMAL
LYMPHOCYTES # BLD AUTO: 0.79 K/UL — LOW (ref 1.2–3.4)
LYMPHOCYTES # BLD AUTO: 2.41 K/UL — SIGNIFICANT CHANGE UP (ref 1.2–3.4)
LYMPHOCYTES # BLD AUTO: 23 % — SIGNIFICANT CHANGE UP (ref 20.5–51.1)
LYMPHOCYTES # BLD AUTO: 3.5 % — LOW (ref 20.5–51.1)
MANUAL SMEAR VERIFICATION: SIGNIFICANT CHANGE UP
MCHC RBC-ENTMCNC: 30.2 PG — SIGNIFICANT CHANGE UP (ref 27–31)
MCHC RBC-ENTMCNC: 30.6 PG — SIGNIFICANT CHANGE UP (ref 27–31)
MCHC RBC-ENTMCNC: 33.7 G/DL — SIGNIFICANT CHANGE UP (ref 32–37)
MCHC RBC-ENTMCNC: 34.4 G/DL — SIGNIFICANT CHANGE UP (ref 32–37)
MCV RBC AUTO: 89 FL — SIGNIFICANT CHANGE UP (ref 81–99)
MCV RBC AUTO: 89.4 FL — SIGNIFICANT CHANGE UP (ref 81–99)
METHADONE UR-MCNC: NEGATIVE — SIGNIFICANT CHANGE UP
MONOCYTES # BLD AUTO: 0.2 K/UL — SIGNIFICANT CHANGE UP (ref 0.1–0.6)
MONOCYTES # BLD AUTO: 0.61 K/UL — HIGH (ref 0.1–0.6)
MONOCYTES NFR BLD AUTO: 0.9 % — LOW (ref 1.7–9.3)
MONOCYTES NFR BLD AUTO: 5.8 % — SIGNIFICANT CHANGE UP (ref 1.7–9.3)
NEUTROPHILS # BLD AUTO: 21 K/UL — HIGH (ref 1.4–6.5)
NEUTROPHILS # BLD AUTO: 7.28 K/UL — HIGH (ref 1.4–6.5)
NEUTROPHILS NFR BLD AUTO: 69.6 % — SIGNIFICANT CHANGE UP (ref 42.2–75.2)
NEUTROPHILS NFR BLD AUTO: 90.4 % — HIGH (ref 42.2–75.2)
NEUTS BAND # BLD: 2.6 % — SIGNIFICANT CHANGE UP (ref 0–6)
NITRITE UR-MCNC: POSITIVE
NRBC # BLD: 0 /100 WBCS — SIGNIFICANT CHANGE UP (ref 0–0)
OPIATES UR-MCNC: NEGATIVE — SIGNIFICANT CHANGE UP
OVALOCYTES BLD QL SMEAR: SLIGHT — SIGNIFICANT CHANGE UP
OXYCODONE UR-MCNC: NEGATIVE — SIGNIFICANT CHANGE UP
PCP UR-MCNC: NEGATIVE — SIGNIFICANT CHANGE UP
PH UR: 6 — SIGNIFICANT CHANGE UP (ref 5–8)
PLAT MORPH BLD: NORMAL — SIGNIFICANT CHANGE UP
PLATELET # BLD AUTO: 260 K/UL — SIGNIFICANT CHANGE UP (ref 130–400)
PLATELET # BLD AUTO: 263 K/UL — SIGNIFICANT CHANGE UP (ref 130–400)
PMV BLD: 10.8 FL — HIGH (ref 7.4–10.4)
PMV BLD: 11.1 FL — HIGH (ref 7.4–10.4)
POIKILOCYTOSIS BLD QL AUTO: SLIGHT — SIGNIFICANT CHANGE UP
PRENATAL SYPHILIS TEST: SIGNIFICANT CHANGE UP
PROPOXYPHENE QUALITATIVE URINE RESULT: NEGATIVE — SIGNIFICANT CHANGE UP
PROT UR-MCNC: 30 MG/DL
PROTHROM AB SERPL-ACNC: 10.4 SEC — SIGNIFICANT CHANGE UP (ref 9.95–12.87)
RBC # BLD: 4.44 M/UL — SIGNIFICANT CHANGE UP (ref 4.2–5.4)
RBC # BLD: 4.51 M/UL — SIGNIFICANT CHANGE UP (ref 4.2–5.4)
RBC # FLD: 13.3 % — SIGNIFICANT CHANGE UP (ref 11.5–14.5)
RBC # FLD: 13.4 % — SIGNIFICANT CHANGE UP (ref 11.5–14.5)
RBC BLD AUTO: ABNORMAL
RBC CASTS # UR COMP ASSIST: 8 /HPF — HIGH (ref 0–4)
SP GR SPEC: 1.02 — SIGNIFICANT CHANGE UP (ref 1–1.03)
SQUAMOUS # UR AUTO: 8 /HPF — HIGH (ref 0–5)
UROBILINOGEN FLD QL: 1 MG/DL — SIGNIFICANT CHANGE UP (ref 0.2–1)
VARIANT LYMPHS # BLD: 2.6 % — SIGNIFICANT CHANGE UP (ref 0–5)
WBC # BLD: 10.46 K/UL — SIGNIFICANT CHANGE UP (ref 4.8–10.8)
WBC # BLD: 22.58 K/UL — HIGH (ref 4.8–10.8)
WBC # FLD AUTO: 10.46 K/UL — SIGNIFICANT CHANGE UP (ref 4.8–10.8)
WBC # FLD AUTO: 22.58 K/UL — HIGH (ref 4.8–10.8)
WBC UR QL: 16 /HPF — HIGH (ref 0–5)

## 2023-08-31 PROCEDURE — 85384 FIBRINOGEN ACTIVITY: CPT

## 2023-08-31 PROCEDURE — 88305 TISSUE EXAM BY PATHOLOGIST: CPT | Mod: 26

## 2023-08-31 PROCEDURE — 80354 DRUG SCREENING FENTANYL: CPT

## 2023-08-31 PROCEDURE — 85025 COMPLETE CBC W/AUTO DIFF WBC: CPT

## 2023-08-31 PROCEDURE — 86850 RBC ANTIBODY SCREEN: CPT

## 2023-08-31 PROCEDURE — 81001 URINALYSIS AUTO W/SCOPE: CPT

## 2023-08-31 PROCEDURE — 59025 FETAL NON-STRESS TEST: CPT

## 2023-08-31 PROCEDURE — 86901 BLOOD TYPING SEROLOGIC RH(D): CPT

## 2023-08-31 PROCEDURE — 80307 DRUG TEST PRSMV CHEM ANLYZR: CPT

## 2023-08-31 PROCEDURE — 88305 TISSUE EXAM BY PATHOLOGIST: CPT

## 2023-08-31 PROCEDURE — 85610 PROTHROMBIN TIME: CPT

## 2023-08-31 PROCEDURE — 87086 URINE CULTURE/COLONY COUNT: CPT

## 2023-08-31 PROCEDURE — 85730 THROMBOPLASTIN TIME PARTIAL: CPT

## 2023-08-31 PROCEDURE — 86900 BLOOD TYPING SEROLOGIC ABO: CPT

## 2023-08-31 PROCEDURE — 36415 COLL VENOUS BLD VENIPUNCTURE: CPT

## 2023-08-31 PROCEDURE — 86703 HIV-1/HIV-2 1 RESULT ANTBDY: CPT

## 2023-08-31 PROCEDURE — 86592 SYPHILIS TEST NON-TREP QUAL: CPT

## 2023-08-31 RX ORDER — DIPHENHYDRAMINE HCL 50 MG
25 CAPSULE ORAL EVERY 6 HOURS
Refills: 0 | Status: DISCONTINUED | OUTPATIENT
Start: 2023-08-31 | End: 2023-09-02

## 2023-08-31 RX ORDER — ACETAMINOPHEN 500 MG
975 TABLET ORAL
Refills: 0 | Status: DISCONTINUED | OUTPATIENT
Start: 2023-08-31 | End: 2023-08-31

## 2023-08-31 RX ORDER — SIMETHICONE 80 MG/1
80 TABLET, CHEWABLE ORAL EVERY 4 HOURS
Refills: 0 | Status: DISCONTINUED | OUTPATIENT
Start: 2023-08-31 | End: 2023-09-02

## 2023-08-31 RX ORDER — SODIUM CHLORIDE 9 MG/ML
1000 INJECTION, SOLUTION INTRAVENOUS
Refills: 0 | Status: DISCONTINUED | OUTPATIENT
Start: 2023-08-31 | End: 2023-08-31

## 2023-08-31 RX ORDER — CEFAZOLIN SODIUM 1 G
2000 VIAL (EA) INJECTION ONCE
Refills: 0 | Status: DISCONTINUED | OUTPATIENT
Start: 2023-08-31 | End: 2023-08-31

## 2023-08-31 RX ORDER — ONDANSETRON 8 MG/1
4 TABLET, FILM COATED ORAL EVERY 6 HOURS
Refills: 0 | Status: DISCONTINUED | OUTPATIENT
Start: 2023-08-31 | End: 2023-08-31

## 2023-08-31 RX ORDER — LANOLIN
1 OINTMENT (GRAM) TOPICAL EVERY 6 HOURS
Refills: 0 | Status: DISCONTINUED | OUTPATIENT
Start: 2023-08-31 | End: 2023-09-02

## 2023-08-31 RX ORDER — MORPHINE SULFATE 50 MG/1
0.15 CAPSULE, EXTENDED RELEASE ORAL ONCE
Refills: 0 | Status: DISCONTINUED | OUTPATIENT
Start: 2023-08-31 | End: 2023-08-31

## 2023-08-31 RX ORDER — LANOLIN
1 OINTMENT (GRAM) TOPICAL EVERY 6 HOURS
Refills: 0 | Status: DISCONTINUED | OUTPATIENT
Start: 2023-08-31 | End: 2023-08-31

## 2023-08-31 RX ORDER — IBUPROFEN 200 MG
600 TABLET ORAL EVERY 6 HOURS
Refills: 0 | Status: DISCONTINUED | OUTPATIENT
Start: 2023-08-31 | End: 2023-09-02

## 2023-08-31 RX ORDER — DEXAMETHASONE 0.5 MG/5ML
4 ELIXIR ORAL EVERY 6 HOURS
Refills: 0 | Status: DISCONTINUED | OUTPATIENT
Start: 2023-08-31 | End: 2023-08-31

## 2023-08-31 RX ORDER — IBUPROFEN 200 MG
600 TABLET ORAL EVERY 6 HOURS
Refills: 0 | Status: COMPLETED | OUTPATIENT
Start: 2023-08-31 | End: 2024-07-29

## 2023-08-31 RX ORDER — OXYCODONE HYDROCHLORIDE 5 MG/1
5 TABLET ORAL
Refills: 0 | Status: DISCONTINUED | OUTPATIENT
Start: 2023-08-31 | End: 2023-09-02

## 2023-08-31 RX ORDER — KETOROLAC TROMETHAMINE 30 MG/ML
30 SYRINGE (ML) INJECTION EVERY 6 HOURS
Refills: 0 | Status: DISCONTINUED | OUTPATIENT
Start: 2023-08-31 | End: 2023-09-01

## 2023-08-31 RX ORDER — CEFAZOLIN SODIUM 1 G
2000 VIAL (EA) INJECTION ONCE
Refills: 0 | Status: COMPLETED | OUTPATIENT
Start: 2023-08-31 | End: 2023-08-31

## 2023-08-31 RX ORDER — MAGNESIUM HYDROXIDE 400 MG/1
30 TABLET, CHEWABLE ORAL
Refills: 0 | Status: DISCONTINUED | OUTPATIENT
Start: 2023-08-31 | End: 2023-09-02

## 2023-08-31 RX ORDER — ENOXAPARIN SODIUM 100 MG/ML
40 INJECTION SUBCUTANEOUS EVERY 24 HOURS
Refills: 0 | Status: DISCONTINUED | OUTPATIENT
Start: 2023-09-01 | End: 2023-09-02

## 2023-08-31 RX ORDER — TETANUS TOXOID, REDUCED DIPHTHERIA TOXOID AND ACELLULAR PERTUSSIS VACCINE, ADSORBED 5; 2.5; 8; 8; 2.5 [IU]/.5ML; [IU]/.5ML; UG/.5ML; UG/.5ML; UG/.5ML
0.5 SUSPENSION INTRAMUSCULAR ONCE
Refills: 0 | Status: DISCONTINUED | OUTPATIENT
Start: 2023-08-31 | End: 2023-09-02

## 2023-08-31 RX ORDER — OXYCODONE HYDROCHLORIDE 5 MG/1
5 TABLET ORAL ONCE
Refills: 0 | Status: DISCONTINUED | OUTPATIENT
Start: 2023-08-31 | End: 2023-09-02

## 2023-08-31 RX ORDER — OXYTOCIN 10 UNIT/ML
333.33 VIAL (ML) INJECTION
Qty: 20 | Refills: 0 | Status: DISCONTINUED | OUTPATIENT
Start: 2023-08-31 | End: 2023-08-31

## 2023-08-31 RX ORDER — SODIUM CHLORIDE 9 MG/ML
1000 INJECTION, SOLUTION INTRAVENOUS ONCE
Refills: 0 | Status: DISCONTINUED | OUTPATIENT
Start: 2023-08-31 | End: 2023-08-31

## 2023-08-31 RX ORDER — ACETAMINOPHEN 500 MG
975 TABLET ORAL
Refills: 0 | Status: DISCONTINUED | OUTPATIENT
Start: 2023-08-31 | End: 2023-09-02

## 2023-08-31 RX ORDER — ACETAMINOPHEN 500 MG
1000 TABLET ORAL ONCE
Refills: 0 | Status: COMPLETED | OUTPATIENT
Start: 2023-08-31 | End: 2023-08-31

## 2023-08-31 RX ORDER — NALOXONE HYDROCHLORIDE 4 MG/.1ML
0.1 SPRAY NASAL
Refills: 0 | Status: DISCONTINUED | OUTPATIENT
Start: 2023-08-31 | End: 2023-08-31

## 2023-08-31 RX ORDER — SIMETHICONE 80 MG/1
80 TABLET, CHEWABLE ORAL EVERY 4 HOURS
Refills: 0 | Status: DISCONTINUED | OUTPATIENT
Start: 2023-08-31 | End: 2023-08-31

## 2023-08-31 RX ORDER — SODIUM CHLORIDE 9 MG/ML
1000 INJECTION, SOLUTION INTRAVENOUS
Refills: 0 | Status: DISCONTINUED | OUTPATIENT
Start: 2023-08-31 | End: 2023-09-02

## 2023-08-31 RX ORDER — MAGNESIUM HYDROXIDE 400 MG/1
30 TABLET, CHEWABLE ORAL
Refills: 0 | Status: DISCONTINUED | OUTPATIENT
Start: 2023-08-31 | End: 2023-08-31

## 2023-08-31 RX ADMIN — Medication 400 MILLIGRAM(S): at 17:42

## 2023-08-31 RX ADMIN — SIMETHICONE 80 MILLIGRAM(S): 80 TABLET, CHEWABLE ORAL at 21:15

## 2023-08-31 RX ADMIN — Medication 0.2 MILLIGRAM(S): at 17:12

## 2023-08-31 RX ADMIN — Medication 975 MILLIGRAM(S): at 21:48

## 2023-08-31 RX ADMIN — Medication 975 MILLIGRAM(S): at 21:14

## 2023-08-31 NOTE — PROCEDURAL SAFETY CHECKLIST WITH OR WITHOUT SEDATION - NSPRESEDATION2FT_GEN_ALL_CORE
Anesthesia confirms case reviewed for anesthesia risk alert. V-Y Plasty Text: The defect edges were debeveled with a #15 scalpel blade.  Given the location of the defect, shape of the defect and the proximity to free margins an V-Y advancement flap was deemed most appropriate.  Using a sterile surgical marker, an appropriate advancement flap was drawn incorporating the defect and placing the expected incisions within the relaxed skin tension lines where possible.    The area thus outlined was incised deep to adipose tissue with a #15 scalpel blade.  The skin margins were undermined to an appropriate distance in all directions utilizing iris scissors.

## 2023-08-31 NOTE — OB PROVIDER DELIVERY SUMMARY - NSSELHIDDEN_OBGYN_ALL_OB_FT
[[email or web address]_ALL_OB_FT:[id number]],[NS_DeliveryRN_OBGYN_ALL_OB_FT:[id number]]

## 2023-08-31 NOTE — OB PROVIDER H&P - NSHPLABSRESULTS_GEN_ALL_CORE
GCT 90    7/13/2023:  EFW 2277 gms, vtx, ant placenta, MVP 4.5 cm, BPP 10/10  4/13/2023: 19.3 weeks: EFW 305g, breech, ant placenta, 3VC, CL 4.75 cm  3/24/2023: 16.4 weeks: EF W162 g, breech, normal development  3/3/2023: 13.4 weeks: NT 1.90 mm, CL 3.95 cm    6/6/2023: fetal echo: normal    Panorama: low risk

## 2023-08-31 NOTE — OB RN DELIVERY SUMMARY - NS_SEPSISRSKCALC_OBGYN_ALL_OB_FT
EOS calculated successfully. EOS Risk Factor: 0.5/1000 live births ([facility] national incidence); GA=39w4d; Temp=98; ROM=0.05; GBS='Unknown'; Antibiotics='No antibiotics or any antibiotics < 2 hrs prior to birth'

## 2023-08-31 NOTE — OB PROVIDER DELIVERY SUMMARY - NSLOWPPHRISK_OBGYN_A_OB
No previous uterine incision/[place] Pregnancy/Less than or equal to 4 previous vaginal births/No known bleeding disorder/No history of postpartum hemorrhage/No other PPH risks indicated

## 2023-08-31 NOTE — OB RN INTRAOPERATIVE NOTE - NSSELHIDDEN_OBGYN_ALL_OB_FT
[NS_DeliveryAttending1_OBGYN_ALL_OB_FT:[id number]=],[NS_DeliveryRN_OBGYN_ALL_OB_FT:[id number]=]

## 2023-08-31 NOTE — OB PROVIDER H&P - HISTORY OF PRESENT ILLNESS
27y  @ 39.4 weeks by first trimester sonogram, LUACS 9/3/2023, presents for scheduled elective primary C/S. Patient has h/o prior  x 2. H/o D&E at 17 weeks for oligo and IUFD. Irregular contractions. Denies VB and LOF. +FM. No complications during this pregnancy.

## 2023-08-31 NOTE — OB RN PATIENT PROFILE - BLOOD TYPED: DATE, OB PROFILE
ED Attending Physician Note      Patient : Sofi Swartz Age: 5 year old Sex: female   MRN: [id number] Encounter Date: 5/11/2022    Chief Complaint   Patient presents with   • Vomiting     This patient was evaluated by the resident, and also evaluated independently by me, Dr. Mukesh Fregoso, and I was involved in all of the medical decision making on this patient. Please see the resident's note for further information on the history, physical, assessment and plan.  History   HPI: SEE BELOW FOR DETAILS  CC: vomiting   Location: generalized  Duration: 12 hours  Onset: spontaneous  Quality: Nonbloody nonbilious  Severity: mild  Radiation:none  Exacerbating factors: none  Alleviating factors: none   Associated symptoms: See below      MEDICATION LIST, ALLERGIES, ARE ALL COPIED BELOW AT BOTTOM OF CHART.  PMH/PSH: See assessement and plan below.  Social Hx: Lives at home with mom brothers and sisters  Family Hx: Noncontributory.      Review of Systems  REVIEW OF SYMPTOMS  Denies fevers, weakness  Denies headache, neck pain, eye pain, sore throat  Denies chest pain  Denies SOB, cough, wheezing  Denies abd pain  Denies dysuria  Denies leg pain, swelling  Denies rash, skin changes  Denies easy bleeding, bruising  Denies joint pain    Physical Exam   Physical Exam  Well appearing patient in no distress  HEENT: MMM, tonsils without exudates  Neck: Supple  Heart: Regular rate and rhythm no murmurs  Lungs: CTAB, No wheezing, crackles, rhonchi.  Abdomen: Normal active bowel sounds, soft nontender nondistended, no rebound or guarding.  Extremities: No edema  Skin: No rash  Lymph: No Lymphadenopathy  Neuro: A&O, no focal deficits    ED Course     Procedures      Clinical Impression   Assessment and plan: 2-wryr-wlecv patient with no past medical history, Presents to the emergency department with nausea, nonbloody nonbilious vomiting, for the past 12 hours.  Since getting Zofran in triage, symptoms resolved patient is playful and  tolerating p.o.  Ate a whole bag of popcorn.      No associated abdominal pain, fevers, hematochezia or melena, or hematemesis.  No travel, no sick contacts.  No evidence of dysentery, and no clinical concern for colitis, diverticulitis, appendicitis, pancreatitis.  No urinary symptoms.  On reevaluation, the patient tolerated p.o., is well-appearing, nontender abdomen, symptoms improved.    Patient was told to return to the emergency department immediately for fevers, abdominal pain, blood in vomit or stool. Pt told to followup with primary care physician in 2 or 3 days.    ...  ...  CLINICAL IMPRESSION  Vomiting             ------------------------------------------------------------------------------------------------------------------------------  Current Discharge Medication List      Prior to Admission Medications    Details   acetaminophen (TYLENOL) 160 MG/5ML suspension Take by mouth every 4 hours as needed for Fever.      bismuth subsalicylate (PEPTO-BISMOL) 262 MG chewable tablet Chew 524 mg by mouth 4 times daily (before meals and nightly).             No Known Allergies    No past medical history on file.    No past surgical history on file.    No family history on file.    Social History     Tobacco Use   • Smoking status: Not on file   • Smokeless tobacco: Not on file   Substance Use Topics   • Alcohol use: Not on file   • Drug use: Not on file       Lab Results     No results found for this visit on 05/11/22.      Radiology Results     Imaging Results    None         ED Medication Orders (From admission, onward)    Ordered Start     Status Ordering Provider    05/11/22 1234 05/11/22 1234  ONDANSETRON 4 MG PO TBDP Pyxis Override        Note to Pharmacy: SARA MAGANA: cabinet override    Ordered                MDM      Disposition          Current Discharge Medication List      New Prescriptions    Details   ondansetron (Zofran ODT) 4 MG disintegrating tablet Place 1 tablet onto the tongue every 6  hours.  Qty: 10 tablet, Refills: 0             Discharge 5/11/2022  3:44 PM  Sofi Swartz discharge to home/self care.          Ya Fregoso MD   5/11/2022 3:46 PM       YA FREGOSO MD   @ [facility]   @ [facility]               Ya Fregoso MD  05/11/22 9184     13-Jan-2023

## 2023-08-31 NOTE — OB PROVIDER H&P - NSHPPHYSICALEXAM_GEN_ALL_CORE
T(C): 36.7 (08-31-23 @ 08:59), Max: 36.7 (08-31-23 @ 08:59)  HR: 77 (08-31-23 @ 09:05) (77 - 77)  BP: 120/65 (08-31-23 @ 09:05) (120/65 - 120/65)  RR: 18 (08-31-23 @ 08:59) (18 - 18)    EFM: 135 bpm, moderate variability, +accels  TOCO: irregular    Abd: soft, gravid, nontender
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
## 2023-08-31 NOTE — CHART NOTE - NSCHARTNOTEFT_GEN_A_CORE
PGY3 NOTE    Patient evaluated at bedside for postpartum bleeding noted on chux per RN. Approximately 300cc of blood and clots noted. On bimanual exam, additional clots evacuated, no tissue appreciated, lower uterine segment found to be intermittent boggy and improved with massage. IM methergine x1 and IV TXA administered. Repeat bimanual exam appreciated, uterus found to be more firm, no additional clots.   Patient reports feeling well, denies HA, lightheadedness, palpitations, nausea.     Vital Signs Last 24 Hrs  T(C): 36.7 (31 Aug 2023 10:47), Max: 36.7 (31 Aug 2023 08:59)  T(F): 98 (31 Aug 2023 08:59), Max: 98 (31 Aug 2023 08:59)  HR: 81 (31 Aug 2023 17:16) (66 - 94)  BP: 113/68 (31 Aug 2023 17:08) (100/56 - 120/65)  RR: 18 (31 Aug 2023 10:47) (18 - 18)  SpO2: 98% (31 Aug 2023 17:16) (95% - 99%)    Will draw stat CBC, Coags, Fibrinogen.   Continue to monitor bleeding.     Dr Sheppard aware.
PACU ANESTHESIA ADMISSION NOTE      Procedure: C/Section  Post op diagnosis:      ____  Intubated  TV:______       Rate: ______      FiO2: ______    _x___  Patent Airway    _x___  Full return of protective reflexes    __  Full recovery from anesthesia / back to baseline status    Vitals:            97.5   BP :   100/56             R: 16             Sat:  99             P:84      Mental Status:  _x___ Awake   _____ Alert   _____ Drowsy   _____ Sedated    Nausea/Vomiting:  _x___  NO       ______Yes,   See Post - Op Orders         Pain Scale (0-10):  __0___    Treatment: __ None    ___x_ See Post - Op/PCA Orders    Post - Operative Fluids:   ____ Oral   ___x_ See Post - Op Orders    Plan: Discharge:   ____Home       ___x__Floor     _____Critical Care    _____  Other:_________________    Comments:  No anesthesia issues or complications noted.  Motor/sensory status - appropriate  Discharge when criteria met.

## 2023-08-31 NOTE — OB RN DELIVERY SUMMARY - NSSELHIDDEN_OBGYN_ALL_OB_FT
[NS_DeliveryAttending1_OBGYN_ALL_OB_FT:[id number]],[NS_DeliveryRN_OBGYN_ALL_OB_FT:[id number]=]

## 2023-08-31 NOTE — OB PROVIDER H&P - NSICDXFAMILYHX_GEN_ALL_CORE_FT
FAMILY HISTORY:  Mother  Still living? Unknown  FH: pancreatic cancer, Age at diagnosis: Age Unknown

## 2023-08-31 NOTE — PRE-ANESTHESIA EVALUATION ADULT - NSANTHADDINFOFT_GEN_ALL_CORE
Thorough discussion of patient's history, as indicated above.  Discussed risks of spinal/epidural, including PDPH, inadequate analgesia occasionally requiring epidural catheter replacement/ general anesthesia, bleeding, infection and spinal cord injury. hypotension and nausea. Patient expressed understanding of these risks, signed informed consent and wishes to proceed with spinal/epidural

## 2023-08-31 NOTE — OB PROVIDER H&P - NS_OBGYNHISTORY_OBGYN_ALL_OB_FT
OB Hx:  x 2. Largest 8-0             ETOP x 1. D&E x 1    G1 2011 FT  F 6-0 [place] No complications +Epi  G2 10/17/2020 FT  F 8-0 [place] No complications +Epi    Gyn Hx: h/o fibroids  Denies cysts, abnormal paps, and STIs.

## 2023-08-31 NOTE — OB PROVIDER DELIVERY SUMMARY - NS_DELIVERYNEONATOLOGIST1_OBGYN_ALL_OB_FT
10/04/21 1100   RN Monitoring of Pain, Safety, and Relapse   Safety Concerns None  (Denies currently, see progress note)   Types of Safety Concerns Denies currently   Physical Concerns Nausea over weekend   Pain Concerns Denies   Use of Street Drugs or Alcohol No   Taking Medications as Prescribed No  (Hasnt yet p/u or started new meds, see progress note)   Patient Response Appropriate feedback   Nursing Comments Patient denies current safety concerns, denies pain, denies AODA, denies AVH.  Patient admits to \"passive thoughts this weekend when I was frustrated about my mood swings and anxiety, and about the doctor appointment on Friday not giving me any answers, I was hoping it was my heart and not just anxiety\".  Patient denies any plan, intent or desire to harm self or others.  Stated \"it was really in the moment, I wanted to check myself into the [facility] Saturday because I was so frustrated but I cant be away from my daughter, so I just cried, washed my face and moved past it really\".  Patient is reporting sleep as \"really good Saturday, Sunday kind of rough, broken up 8 hours but feel ok\".  Patient reporting low appetite Saturday however stated \"hungry today now and feel better with the nausea, we all kind of had a stomach bug in the house I think this weekend\".  Patient reporting adequate hydration.  Patient reporting compliance with pta medication however admits that despite cardiologist on Friday advising her to start medications prescribed by PHP MD, she has not picked them up or started.  Stated \"I will get them today now and start them while I can be here this week, had that nausea over the weekend so I didnt want to start them then but I know I want and need to now\".  No further questions or concerns at this time.  Support given with treatment.     Nursing Suicide Assessment Note - PHP    Current assessment:  Denies current safety concerns.      Current C-SSRS score: Negative screen= no  ideation, behaviors or history     Protective Factors / Reason for Living:  Family, social supports, responsibility to children.    Interventions:    -   Maintain current plan of care.  Safety plan reviewed.     Rastafari

## 2023-08-31 NOTE — OB PROVIDER H&P - NSPREVIOUSLIVEBIRTHSNOWLIVING_OBGYN_ALL_OB_NU
2 Xenograft Text: The defect edges were debeveled with a #15 scalpel blade.  Given the location of the defect, shape of the defect and the proximity to free margins a xenograft was deemed most appropriate.  The graft was then trimmed to fit the size of the defect.  The graft was then placed in the primary defect and oriented appropriately.

## 2023-08-31 NOTE — OB PROVIDER H&P - ASSESSMENT
27y  @ 39.4 weeks, h/o IUFD, elective primary c/s.    Plan:  Admit to L&D  IVF  NPO diet  Continuous TOCO/EFM  Ancef prior to OR  Abdominal prep  SCDs B/L  Pain Management PRN    Dr. Kerns aware

## 2023-09-01 LAB
BASOPHILS # BLD AUTO: 0.03 K/UL — SIGNIFICANT CHANGE UP (ref 0–0.2)
BASOPHILS NFR BLD AUTO: 0.2 % — SIGNIFICANT CHANGE UP (ref 0–1)
CULTURE RESULTS: SIGNIFICANT CHANGE UP
EOSINOPHIL # BLD AUTO: 0.06 K/UL — SIGNIFICANT CHANGE UP (ref 0–0.7)
EOSINOPHIL NFR BLD AUTO: 0.4 % — SIGNIFICANT CHANGE UP (ref 0–8)
HCT VFR BLD CALC: 29 % — LOW (ref 37–47)
HGB BLD-MCNC: 9.9 G/DL — LOW (ref 12–16)
IMM GRANULOCYTES NFR BLD AUTO: 0.5 % — HIGH (ref 0.1–0.3)
LYMPHOCYTES # BLD AUTO: 16.3 % — LOW (ref 20.5–51.1)
LYMPHOCYTES # BLD AUTO: 2.66 K/UL — SIGNIFICANT CHANGE UP (ref 1.2–3.4)
MCHC RBC-ENTMCNC: 30.7 PG — SIGNIFICANT CHANGE UP (ref 27–31)
MCHC RBC-ENTMCNC: 34.1 G/DL — SIGNIFICANT CHANGE UP (ref 32–37)
MCV RBC AUTO: 89.8 FL — SIGNIFICANT CHANGE UP (ref 81–99)
MONOCYTES # BLD AUTO: 1.09 K/UL — HIGH (ref 0.1–0.6)
MONOCYTES NFR BLD AUTO: 6.7 % — SIGNIFICANT CHANGE UP (ref 1.7–9.3)
NEUTROPHILS # BLD AUTO: 12.4 K/UL — HIGH (ref 1.4–6.5)
NEUTROPHILS NFR BLD AUTO: 75.9 % — HIGH (ref 42.2–75.2)
NRBC # BLD: 0 /100 WBCS — SIGNIFICANT CHANGE UP (ref 0–0)
PLATELET # BLD AUTO: 234 K/UL — SIGNIFICANT CHANGE UP (ref 130–400)
PMV BLD: 10.9 FL — HIGH (ref 7.4–10.4)
RBC # BLD: 3.23 M/UL — LOW (ref 4.2–5.4)
RBC # FLD: 13.2 % — SIGNIFICANT CHANGE UP (ref 11.5–14.5)
SPECIMEN SOURCE: SIGNIFICANT CHANGE UP
WBC # BLD: 16.32 K/UL — HIGH (ref 4.8–10.8)
WBC # FLD AUTO: 16.32 K/UL — HIGH (ref 4.8–10.8)

## 2023-09-01 RX ORDER — IBUPROFEN 200 MG
1 TABLET ORAL
Qty: 56 | Refills: 0
Start: 2023-09-01 | End: 2023-09-14

## 2023-09-01 RX ORDER — ACETAMINOPHEN 500 MG
3 TABLET ORAL
Qty: 168 | Refills: 0
Start: 2023-09-01 | End: 2023-09-14

## 2023-09-01 RX ORDER — NITROFURANTOIN MACROCRYSTAL 50 MG
100 CAPSULE ORAL
Refills: 0 | Status: DISCONTINUED | OUTPATIENT
Start: 2023-09-01 | End: 2023-09-01

## 2023-09-01 RX ORDER — NITROFURANTOIN MACROCRYSTAL 50 MG
1 CAPSULE ORAL
Qty: 8 | Refills: 0
Start: 2023-09-01 | End: 2023-09-04

## 2023-09-01 RX ORDER — SIMETHICONE 80 MG/1
1 TABLET, CHEWABLE ORAL
Qty: 84 | Refills: 0
Start: 2023-09-01 | End: 2023-09-14

## 2023-09-01 RX ORDER — NITROFURANTOIN MACROCRYSTAL 50 MG
100 CAPSULE ORAL EVERY 12 HOURS
Refills: 0 | Status: DISCONTINUED | OUTPATIENT
Start: 2023-09-01 | End: 2023-09-02

## 2023-09-01 RX ADMIN — Medication 975 MILLIGRAM(S): at 21:56

## 2023-09-01 RX ADMIN — Medication 30 MILLIGRAM(S): at 00:03

## 2023-09-01 RX ADMIN — Medication 30 MILLIGRAM(S): at 18:40

## 2023-09-01 RX ADMIN — Medication 975 MILLIGRAM(S): at 09:38

## 2023-09-01 RX ADMIN — Medication 975 MILLIGRAM(S): at 09:58

## 2023-09-01 RX ADMIN — Medication 100 MILLIGRAM(S): at 10:29

## 2023-09-01 RX ADMIN — Medication 30 MILLIGRAM(S): at 02:45

## 2023-09-01 RX ADMIN — Medication 30 MILLIGRAM(S): at 06:59

## 2023-09-01 RX ADMIN — SIMETHICONE 80 MILLIGRAM(S): 80 TABLET, CHEWABLE ORAL at 10:29

## 2023-09-01 RX ADMIN — Medication 30 MILLIGRAM(S): at 06:35

## 2023-09-01 RX ADMIN — Medication 975 MILLIGRAM(S): at 21:23

## 2023-09-01 RX ADMIN — SIMETHICONE 80 MILLIGRAM(S): 80 TABLET, CHEWABLE ORAL at 18:41

## 2023-09-01 RX ADMIN — Medication 975 MILLIGRAM(S): at 04:29

## 2023-09-01 RX ADMIN — Medication 975 MILLIGRAM(S): at 03:51

## 2023-09-01 RX ADMIN — Medication 30 MILLIGRAM(S): at 12:11

## 2023-09-01 RX ADMIN — ENOXAPARIN SODIUM 40 MILLIGRAM(S): 100 INJECTION SUBCUTANEOUS at 03:51

## 2023-09-01 RX ADMIN — Medication 100 MILLIGRAM(S): at 21:49

## 2023-09-01 RX ADMIN — Medication 975 MILLIGRAM(S): at 16:30

## 2023-09-01 RX ADMIN — SIMETHICONE 80 MILLIGRAM(S): 80 TABLET, CHEWABLE ORAL at 06:35

## 2023-09-01 RX ADMIN — Medication 30 MILLIGRAM(S): at 19:10

## 2023-09-01 RX ADMIN — Medication 30 MILLIGRAM(S): at 12:41

## 2023-09-01 RX ADMIN — Medication 975 MILLIGRAM(S): at 16:00

## 2023-09-01 RX ADMIN — SIMETHICONE 80 MILLIGRAM(S): 80 TABLET, CHEWABLE ORAL at 21:50

## 2023-09-01 RX ADMIN — SIMETHICONE 80 MILLIGRAM(S): 80 TABLET, CHEWABLE ORAL at 14:19

## 2023-09-01 NOTE — DISCHARGE NOTE OB - HOSPITAL COURSE
DATE OF DISCHARGE: 23 @ 08:54    HISTORY OF PRESENT ILLNESS/HOSPITAL COURSE: HPI:  27y  @ 39.4 weeks by first trimester sonogram, LUCAS 9/3/2023, presents for scheduled elective primary C/S. Patient has h/o prior  x 2. H/o D&E at 17 weeks for oligo and IUFD. Irregular contractions. Denies VB and LOF. +FM. No complications during this pregnancy.  (31 Aug 2023 09:32)    PAST MEDICAL & SURGICAL HISTORY:  No pertinent past medical history      History of D&C          PROCEDURES PERFORMED:  section  COMPLICATIONS:  EBL 1500 in OR, Hb stable at time of d/c   POST PARTUM COURSE: uncomplicated, discharged home  FINAL DIAGNOSIS:  Status post normal spontaneous vaginal delivery at Gestational Age    DISCHARGE CBC:                       9.9    16.32 )-----------( 234      ( 01 Sep 2023 05:41 )             29.0     DISCHARGE INSTRUCTIONS:  If you have severe abdominal pain, heavy vaginal bleeding, shortness of breath or chest pain please call your doctor or come to the emergency room.   DIET:  Advance as tolerated.  ACTIVITY:  Advance as tolerated.  Pelvic rest for 6 weeks.  Nothing to be inserted into the vagina for 6 weeks, i.e. no tampons, douching, sexual relations, or tub baths.   FOLLOW UP: Make an appointment to see your doctor as instructed

## 2023-09-01 NOTE — DISCHARGE NOTE OB - MEDICATION SUMMARY - MEDICATIONS TO TAKE
I will START or STAY ON the medications listed below when I get home from the hospital:    ibuprofen 600 mg oral tablet  -- 1 tab(s) by mouth every 6 hours  -- Indication: For pain    acetaminophen 325 mg oral tablet  -- 3 tab(s) by mouth every 6 hours  -- Indication: For pain    simethicone 80 mg oral tablet, chewable  -- 1 tab(s) by mouth every 4 hours  -- Indication: For gas pain    nitrofurantoin macrocrystals-monohydrate 100 mg oral capsule  -- 1 cap(s) by mouth 2 times a day  -- Indication: For urinary tract infection   I will START or STAY ON the medications listed below when I get home from the hospital:    ibuprofen 600 mg oral tablet  -- 1 tab(s) by mouth every 6 hours  -- Indication: For pain    acetaminophen 325 mg oral tablet  -- 3 tab(s) by mouth every 6 hours  -- Indication: For pain    simethicone 80 mg oral tablet, chewable  -- 1 tab(s) by mouth every 4 hours  -- Indication: For gas pain

## 2023-09-01 NOTE — DISCHARGE NOTE OB - PATIENT PORTAL LINK FT
You can access the FollowMyHealth Patient Portal offered by [facility] by registering at the following website: [email or web address]. By joining [employer]’s FollowMyHealth portal, you will also be able to view your health information using other applications (apps) compatible with our system.

## 2023-09-01 NOTE — DISCHARGE NOTE OB - MEDICATION SUMMARY - MEDICATIONS TO STOP TAKING
I will STOP taking the medications listed below when I get home from the hospital:    Cleocin 2% vaginal cream  -- 1 applicatorful intravaginally once a day for 7 days  -- For vaginal use.

## 2023-09-01 NOTE — DISCHARGE NOTE OB - CARE PROVIDER_API CALL
Tomer Kerns  Obstetrics and Gynecology  [address] [place] [address]  Phone: [phone]  Fax: [phone]  Follow Up Time: 1 week

## 2023-09-01 NOTE — DISCHARGE NOTE OB - CARE PLAN
1 Principal Discharge DX:	Delivery by elective  section  Assessment and plan of treatment:	Nothing in the vagina for 6 weeks (no sex, no tampons, no douching). Avoid tub baths, you may shower.  If you have a fever of 100.4F or greater, severe vaginal bleeding, or severe abdominal pain, call your Ob/Gyn or come to the emergency department immediately.  Please follow up with your provider in 1 week for incision check and 6 weeks for postpartum visit.

## 2023-09-01 NOTE — DISCHARGE NOTE OB - NS MD DC FALL RISK RISK
For information on Fall & Injury Prevention, visit: [email or web address] OR  [email or web address] OR  https://www.cdc.gov/steadi/patient.html

## 2023-09-02 VITALS
HEART RATE: 95 BPM | SYSTOLIC BLOOD PRESSURE: 121 MMHG | TEMPERATURE: 100 F | RESPIRATION RATE: 18 BRPM | DIASTOLIC BLOOD PRESSURE: 66 MMHG

## 2023-09-02 RX ORDER — IBUPROFEN 200 MG
600 TABLET ORAL EVERY 6 HOURS
Refills: 0 | Status: DISCONTINUED | OUTPATIENT
Start: 2023-09-02 | End: 2023-09-02

## 2023-09-02 RX ADMIN — SIMETHICONE 80 MILLIGRAM(S): 80 TABLET, CHEWABLE ORAL at 14:35

## 2023-09-02 RX ADMIN — Medication 975 MILLIGRAM(S): at 15:10

## 2023-09-02 RX ADMIN — Medication 600 MILLIGRAM(S): at 11:54

## 2023-09-02 RX ADMIN — Medication 975 MILLIGRAM(S): at 09:47

## 2023-09-02 RX ADMIN — Medication 975 MILLIGRAM(S): at 04:20

## 2023-09-02 RX ADMIN — ENOXAPARIN SODIUM 40 MILLIGRAM(S): 100 INJECTION SUBCUTANEOUS at 04:40

## 2023-09-02 RX ADMIN — Medication 600 MILLIGRAM(S): at 13:00

## 2023-09-02 RX ADMIN — SIMETHICONE 80 MILLIGRAM(S): 80 TABLET, CHEWABLE ORAL at 09:47

## 2023-09-02 RX ADMIN — Medication 600 MILLIGRAM(S): at 06:42

## 2023-09-02 RX ADMIN — Medication 975 MILLIGRAM(S): at 10:16

## 2023-09-02 RX ADMIN — SIMETHICONE 80 MILLIGRAM(S): 80 TABLET, CHEWABLE ORAL at 06:42

## 2023-09-02 RX ADMIN — Medication 975 MILLIGRAM(S): at 03:25

## 2023-09-02 RX ADMIN — Medication 600 MILLIGRAM(S): at 07:20

## 2023-09-02 RX ADMIN — Medication 975 MILLIGRAM(S): at 14:35

## 2023-09-02 NOTE — PROGRESS NOTE ADULT - SUBJECTIVE AND OBJECTIVE BOX
POD #1  afebrile   feels well   ambulating no dizziness   abd soft   fundus firm   incision clean dry intact   extr 1+ edema   cbc hb drop 13 to 9.9 tolerating well   will supplement iron  requesting early discharge in am   cleared if afebrile and no events and baby discharged
 DEBBY LANDRY  27y  Female    PGY1 Note:    Patient seen and examined bedside. No overnight events. Denies heavy vaginal bleeding. Pain well controlled. Denies headaches, vision changes, dizziness, lightheadedness, CP, SOB, palpitations, RUQ pain. Denies fever, chills, nausea/vomiting. Ambulating without difficulty. Tolerating diet, voiding, passing flatus, no BM. Breastfeeding. Feels ready to go home today.     Physical Exam  Vital Signs Last 24 Hrs  T(C): 36.7 (02 Sep 2023 08:12), Max: 37 (01 Sep 2023 19:50)  T(F): 98 (02 Sep 2023 08:12), Max: 98.6 (01 Sep 2023 19:50)  HR: 92 (02 Sep 2023 08:12) (84 - 97)  BP: 114/61 (02 Sep 2023 08:12) (105/52 - 120/58)  RR: 18 (02 Sep 2023 08:12) (18 - 20)    Gen: NAD, sitting comfortably  CV: RRR. No murmurs gallops or rubs.  Pulm: CTAB. No wheezes or rales.  Ext: No calf tenderness, no swelling.   Abd: Nondistended, soft, nontender, BS+, fundus firm, and below umbilicus.   Lochia: Minimal rubra  Wound: Pfannenstiel skin incision clean, dry intact. Steris in place. No surrounding edema or erythema.    PAST MEDICAL & SURGICAL HISTORY:  No pertinent past medical history  History of D&E    Diet: regular    Labs:                        9.9    16.32 )-----------( 234      ( 01 Sep 2023 05:41 )             29.0                         13.6   22.58 )-----------( 260      ( 31 Aug 2023 17:40 )             40.3          acetaminophen     Tablet .. 975 milliGRAM(s) Oral <User Schedule>  diphenhydrAMINE 25 milliGRAM(s) Oral every 6 hours PRN  diphenhydrAMINE 25 milliGRAM(s) Oral every 6 hours PRN  diphtheria/tetanus/pertussis (acellular) Vaccine (Adacel) 0.5 milliLiter(s) IntraMuscular once  enoxaparin Injectable 40 milliGRAM(s) SubCutaneous every 24 hours  ibuprofen  Tablet. 600 milliGRAM(s) Oral every 6 hours  ibuprofen  Tablet. 600 milliGRAM(s) Oral every 6 hours  ibuprofen  Tablet. 600 milliGRAM(s) Oral every 6 hours  lactated ringers. 1000 milliLiter(s) IV Continuous <Continuous>  lanolin Ointment 1 Application(s) Topical every 6 hours PRN  magnesium hydroxide Suspension 30 milliLiter(s) Oral two times a day PRN  nitrofurantoin monohydrate/macrocrystals (MACROBID) 100 milliGRAM(s) Oral every 12 hours  oxyCODONE    IR 5 milliGRAM(s) Oral every 3 hours PRN  oxyCODONE    IR 5 milliGRAM(s) Oral every 3 hours PRN  oxyCODONE    IR 5 milliGRAM(s) Oral once PRN  oxyCODONE    IR 5 milliGRAM(s) Oral once PRN  oxyCODONE    IR 5 milliGRAM(s) Oral once PRN  oxyCODONE    IR 5 milliGRAM(s) Oral every 3 hours PRN  simethicone 80 milliGRAM(s) Chew every 4 hours        
 DEBBY LANDRY  27y  Female    PGY1 Note:    Patient seen and examined bedside. No overnight events. Denies heavy vaginal bleeding. Denies flatus, denies bloating or abdominal pain. Pain well controlled. Denies headaches, vision changes, dizziness, lightheadedness, CP, SOB, palpitations, RUQ pain. Denies fever, chills, nausea/vomiting. Bee in place, not yet ambulating, UO adequate.  Tolerating diet, voiding, , no BM. Breastfeeding.     Physical Exam  Vital Signs Last 24 Hrs  T(C): 37.1 (01 Sep 2023 03:33), Max: 37.1 (01 Sep 2023 03:33)  T(F): 98.7 (01 Sep 2023 03:33), Max: 98.7 (01 Sep 2023 03:33)  HR: 100 (01 Sep 2023 03:33) (66 - 100)  BP: 104/52 (01 Sep 2023 03:33) (100/56 - 120/65)  RR: 18 (01 Sep 2023 03:33) (18 - 20)  SpO2: 96% (31 Aug 2023 18:57) (95% - 99%)        Gen: NAD, sitting comfortably  CV: RRR. No murmurs gallops or rubs.  Pulm: CTAB. No wheezes or rales.  Ext: No calf tenderness, no swelling.   Abd: Nondistended, soft, nontender, BS+, fundus firm, and below umbilicus.   Lochia: Minimal rubra  Wound: Dressing changed. Pfannenstiel skin incision clean, dry intact. Steris in place. No surrounding edema or erythema.        PAST MEDICAL & SURGICAL HISTORY:  No pertinent past medical history      History of D&C          Diet:    Labs:                        13.6   22.58 )-----------( 260      ( 31 Aug 2023 17:40 )             40.3                         13.6   10.46 )-----------( 263      ( 31 Aug 2023 09:27 )             39.5          acetaminophen     Tablet .. 975 milliGRAM(s) Oral <User Schedule>  diphenhydrAMINE 25 milliGRAM(s) Oral every 6 hours PRN  diphenhydrAMINE 25 milliGRAM(s) Oral every 6 hours PRN  diphtheria/tetanus/pertussis (acellular) Vaccine (Adacel) 0.5 milliLiter(s) IntraMuscular once  enoxaparin Injectable 40 milliGRAM(s) SubCutaneous every 24 hours  ibuprofen  Tablet. 600 milliGRAM(s) Oral every 6 hours  ibuprofen  Tablet. 600 milliGRAM(s) Oral every 6 hours  ibuprofen  Tablet. 600 milliGRAM(s) Oral every 6 hours  ketorolac   Injectable 30 milliGRAM(s) IV Push every 6 hours  lactated ringers. 1000 milliLiter(s) IV Continuous <Continuous>  lanolin Ointment 1 Application(s) Topical every 6 hours PRN  magnesium hydroxide Suspension 30 milliLiter(s) Oral two times a day PRN  oxyCODONE    IR 5 milliGRAM(s) Oral once PRN  oxyCODONE    IR 5 milliGRAM(s) Oral once PRN  oxyCODONE    IR 5 milliGRAM(s) Oral once PRN  oxyCODONE    IR 5 milliGRAM(s) Oral every 3 hours PRN  oxyCODONE    IR 5 milliGRAM(s) Oral every 3 hours PRN  oxyCODONE    IR 5 milliGRAM(s) Oral every 3 hours PRN  simethicone 80 milliGRAM(s) Chew every 4 hours        
PGY 1 Note:    Pt seen and evaluated at bedside. Pt doing well, pain well controlled. No overnight events, no acute complaints.  Denies dizziness/lightheadedness, fever, chills, nausea/vomiting, dysuria, LE pain, or heavy vaginal bleeding.   Ambulating: No  Voiding: Bee in place  Flatus: No  Bowel movements: No   Diet: tolerating PO     Physical Exam  Vital Signs Last 24 Hrs  T(C): 36.9 (31 Aug 2023 19:51), Max: 36.9 (31 Aug 2023 19:51)  T(F): 98.4 (31 Aug 2023 19:51), Max: 98.4 (31 Aug 2023 19:51)  HR: 80 (31 Aug 2023 19:51) (66 - 96)  BP: 112/65 (31 Aug 2023 19:51) (100/56 - 120/65)  RR: 20 (31 Aug 2023 19:51) (18 - 20)  SpO2: 96% (31 Aug 2023 18:57) (95% - 99%)      Gen: AAOx3, NAD  Heart: RRR, S1S2+  Lungs: CTA B/L, no r/r/w   Fundus: firm, below umbilicus   Wound: Pfannenstiel incision, steris in place c/d/i. No surrounding erythema or edema.   Abd: Soft, nontender, nondistended, BS+  Lochia: minimal   Ext: No calf tenderness, no swelling    Labs:                        13.6   22.58 )-----------( 260      ( 31 Aug 2023 17:40 )             40.3                         13.6   10.46 )-----------( 263      ( 31 Aug 2023 09:27 )             39.5        MEDICATIONS  (STANDING):  acetaminophen     Tablet .. 975 milliGRAM(s) Oral <User Schedule>  diphtheria/tetanus/pertussis (acellular) Vaccine (Adacel) 0.5 milliLiter(s) IntraMuscular once  ibuprofen  Tablet. 600 milliGRAM(s) Oral every 6 hours  ibuprofen  Tablet. 600 milliGRAM(s) Oral every 6 hours  ibuprofen  Tablet. 600 milliGRAM(s) Oral every 6 hours  ketorolac   Injectable 30 milliGRAM(s) IV Push every 6 hours  lactated ringers. 1000 milliLiter(s) (125 mL/Hr) IV Continuous <Continuous>  simethicone 80 milliGRAM(s) Chew every 4 hours    MEDICATIONS  (PRN):  diphenhydrAMINE 25 milliGRAM(s) Oral every 6 hours PRN Pruritus  diphenhydrAMINE 25 milliGRAM(s) Oral every 6 hours PRN Pruritus  lanolin Ointment 1 Application(s) Topical every 6 hours PRN Sore Nipples  magnesium hydroxide Suspension 30 milliLiter(s) Oral two times a day PRN Constipation  oxyCODONE    IR 5 milliGRAM(s) Oral once PRN Moderate to Severe Pain (4-10)  oxyCODONE    IR 5 milliGRAM(s) Oral once PRN Moderate to Severe Pain (4-10)  oxyCODONE    IR 5 milliGRAM(s) Oral once PRN Moderate to Severe Pain (4-10)  oxyCODONE    IR 5 milliGRAM(s) Oral every 3 hours PRN Moderate to Severe Pain (4-10)  oxyCODONE    IR 5 milliGRAM(s) Oral every 3 hours PRN Moderate to Severe Pain (4-10)  oxyCODONE    IR 5 milliGRAM(s) Oral every 3 hours PRN Moderate to Severe Pain (4-10)

## 2023-09-02 NOTE — PROGRESS NOTE ADULT - ASSESSMENT
A/P: 26yo now P3 S/P primary C/S for macrosomnia  s/p IM methergine and TXA, POD0, EBL 1200+300cc, recovering well   - pain management with PO pain meds   - monitor vitals/bleeding   - encourage incentive spirometry   - ambulation/PO hydration  - advance diet as tolerated   - simethicone  - SCDs/lovenox for DVT prophylaxis   - f/u AM labs and Ucx  - routine postop care   - Adequate UOP, plan for sanches removal in AM  Dr. Patton and Dr. Hernández to be made aware
A/P: 27y P s/p elective primary LTCS POD #1 , recovering well     -ambulation encouraged  -PO hydration encouraged  -regular diet  -lovenox ordered for DVT prophylaxis  -Incentive Spirometry encouraged  -pain management per routine  -UO adequate, sanches to be taken out this am  -f/u AM CBC        Dr. Martinez and Dr. Cifuentes to be made aware. 
A/P: 27y now P3 s/p primary elective LTCS, EBL 1200+300, s/p 1xmethergine and 1xTXA, POD #2 , recovering well.    -ambulation encouraged  -PO hydration encouraged  -regular diet  -lovenox ordered for DVT prophylaxis  -Incentive Spirometry encouraged  -pain management per routine    Dr. Dubois and Dr. Cifuentes to be made aware.

## 2023-09-07 DIAGNOSIS — D25.1 INTRAMURAL LEIOMYOMA OF UTERUS: ICD-10-CM

## 2023-09-07 DIAGNOSIS — O36.63X0 MATERNAL CARE FOR EXCESSIVE FETAL GROWTH, THIRD TRIMESTER, NOT APPLICABLE OR UNSPECIFIED: ICD-10-CM

## 2023-09-07 DIAGNOSIS — Z28.09 IMMUNIZATION NOT CARRIED OUT BECAUSE OF OTHER CONTRAINDICATION: ICD-10-CM

## 2023-09-07 DIAGNOSIS — Z3A.39 39 WEEKS GESTATION OF PREGNANCY: ICD-10-CM

## 2023-09-07 DIAGNOSIS — Z28.21 IMMUNIZATION NOT CARRIED OUT BECAUSE OF PATIENT REFUSAL: ICD-10-CM

## 2023-09-07 DIAGNOSIS — O34.13 MATERNAL CARE FOR BENIGN TUMOR OF CORPUS UTERI, THIRD TRIMESTER: ICD-10-CM

## 2024-05-11 ENCOUNTER — EMERGENCY (EMERGENCY)
Facility: HOSPITAL | Age: 29
LOS: 0 days | Discharge: HOME | End: 2024-05-11
Attending: STUDENT IN AN ORGANIZED HEALTH CARE EDUCATION/TRAINING PROGRAM
Payer: MEDICAID

## 2024-05-11 VITALS
RESPIRATION RATE: 18 BRPM | TEMPERATURE: 99 F | SYSTOLIC BLOOD PRESSURE: 128 MMHG | WEIGHT: 134.92 LBS | OXYGEN SATURATION: 100 % | DIASTOLIC BLOOD PRESSURE: 70 MMHG | HEART RATE: 73 BPM

## 2024-05-11 DIAGNOSIS — R11.2 NAUSEA WITH VOMITING, UNSPECIFIED: ICD-10-CM

## 2024-05-11 DIAGNOSIS — Z98.890 OTHER SPECIFIED POSTPROCEDURAL STATES: Chronic | ICD-10-CM

## 2024-05-11 LAB
ALBUMIN SERPL ELPH-MCNC: 4.7 G/DL — SIGNIFICANT CHANGE UP (ref 3.5–5.2)
ALP SERPL-CCNC: 50 U/L — SIGNIFICANT CHANGE UP (ref 30–115)
ALT FLD-CCNC: 14 U/L — SIGNIFICANT CHANGE UP (ref 0–41)
ANION GAP SERPL CALC-SCNC: 9 MMOL/L — SIGNIFICANT CHANGE UP (ref 7–14)
AST SERPL-CCNC: 19 U/L — SIGNIFICANT CHANGE UP (ref 0–41)
BASOPHILS # BLD AUTO: 0.04 K/UL — SIGNIFICANT CHANGE UP (ref 0–0.2)
BASOPHILS NFR BLD AUTO: 0.4 % — SIGNIFICANT CHANGE UP (ref 0–1)
BILIRUB SERPL-MCNC: 0.6 MG/DL — SIGNIFICANT CHANGE UP (ref 0.2–1.2)
BUN SERPL-MCNC: 11 MG/DL — SIGNIFICANT CHANGE UP (ref 10–20)
CALCIUM SERPL-MCNC: 9.6 MG/DL — SIGNIFICANT CHANGE UP (ref 8.4–10.5)
CHLORIDE SERPL-SCNC: 103 MMOL/L — SIGNIFICANT CHANGE UP (ref 98–110)
CO2 SERPL-SCNC: 24 MMOL/L — SIGNIFICANT CHANGE UP (ref 17–32)
CREAT SERPL-MCNC: 0.6 MG/DL — LOW (ref 0.7–1.5)
EGFR: 126 ML/MIN/1.73M2 — SIGNIFICANT CHANGE UP
EOSINOPHIL # BLD AUTO: 0.08 K/UL — SIGNIFICANT CHANGE UP (ref 0–0.7)
EOSINOPHIL NFR BLD AUTO: 0.7 % — SIGNIFICANT CHANGE UP (ref 0–8)
GLUCOSE SERPL-MCNC: 88 MG/DL — SIGNIFICANT CHANGE UP (ref 70–99)
HCG SERPL QL: NEGATIVE — SIGNIFICANT CHANGE UP
HCG SERPL-ACNC: <1 MIU/ML — SIGNIFICANT CHANGE UP
HCT VFR BLD CALC: 36.5 % — LOW (ref 37–47)
HGB BLD-MCNC: 12.5 G/DL — SIGNIFICANT CHANGE UP (ref 12–16)
IMM GRANULOCYTES NFR BLD AUTO: 0.2 % — SIGNIFICANT CHANGE UP (ref 0.1–0.3)
LYMPHOCYTES # BLD AUTO: 2.86 K/UL — SIGNIFICANT CHANGE UP (ref 1.2–3.4)
LYMPHOCYTES # BLD AUTO: 26.3 % — SIGNIFICANT CHANGE UP (ref 20.5–51.1)
MCHC RBC-ENTMCNC: 30.3 PG — SIGNIFICANT CHANGE UP (ref 27–31)
MCHC RBC-ENTMCNC: 34.2 G/DL — SIGNIFICANT CHANGE UP (ref 32–37)
MCV RBC AUTO: 88.6 FL — SIGNIFICANT CHANGE UP (ref 81–99)
MONOCYTES # BLD AUTO: 0.58 K/UL — SIGNIFICANT CHANGE UP (ref 0.1–0.6)
MONOCYTES NFR BLD AUTO: 5.3 % — SIGNIFICANT CHANGE UP (ref 1.7–9.3)
NEUTROPHILS # BLD AUTO: 7.28 K/UL — HIGH (ref 1.4–6.5)
NEUTROPHILS NFR BLD AUTO: 67.1 % — SIGNIFICANT CHANGE UP (ref 42.2–75.2)
NRBC # BLD: 0 /100 WBCS — SIGNIFICANT CHANGE UP (ref 0–0)
PLATELET # BLD AUTO: 364 K/UL — SIGNIFICANT CHANGE UP (ref 130–400)
PMV BLD: 10.3 FL — SIGNIFICANT CHANGE UP (ref 7.4–10.4)
POTASSIUM SERPL-MCNC: 4.6 MMOL/L — SIGNIFICANT CHANGE UP (ref 3.5–5)
POTASSIUM SERPL-SCNC: 4.6 MMOL/L — SIGNIFICANT CHANGE UP (ref 3.5–5)
PROT SERPL-MCNC: 7.4 G/DL — SIGNIFICANT CHANGE UP (ref 6–8)
RBC # BLD: 4.12 M/UL — LOW (ref 4.2–5.4)
RBC # FLD: 13.2 % — SIGNIFICANT CHANGE UP (ref 11.5–14.5)
SODIUM SERPL-SCNC: 136 MMOL/L — SIGNIFICANT CHANGE UP (ref 135–146)
WBC # BLD: 10.86 K/UL — HIGH (ref 4.8–10.8)
WBC # FLD AUTO: 10.86 K/UL — HIGH (ref 4.8–10.8)

## 2024-05-11 PROCEDURE — 85025 COMPLETE CBC W/AUTO DIFF WBC: CPT

## 2024-05-11 PROCEDURE — 84702 CHORIONIC GONADOTROPIN TEST: CPT

## 2024-05-11 PROCEDURE — 99284 EMERGENCY DEPT VISIT MOD MDM: CPT

## 2024-05-11 PROCEDURE — 84703 CHORIONIC GONADOTROPIN ASSAY: CPT

## 2024-05-11 PROCEDURE — 99283 EMERGENCY DEPT VISIT LOW MDM: CPT | Mod: 25

## 2024-05-11 PROCEDURE — 36415 COLL VENOUS BLD VENIPUNCTURE: CPT

## 2024-05-11 PROCEDURE — 80053 COMPREHEN METABOLIC PANEL: CPT

## 2024-05-11 PROCEDURE — 36000 PLACE NEEDLE IN VEIN: CPT

## 2024-05-11 RX ORDER — SODIUM CHLORIDE 9 MG/ML
1000 INJECTION, SOLUTION INTRAVENOUS ONCE
Refills: 0 | Status: COMPLETED | OUTPATIENT
Start: 2024-05-11 | End: 2024-05-11

## 2024-05-11 RX ORDER — ONDANSETRON 8 MG/1
4 TABLET, FILM COATED ORAL ONCE
Refills: 0 | Status: COMPLETED | OUTPATIENT
Start: 2024-05-11 | End: 2024-05-11

## 2024-05-11 RX ADMIN — ONDANSETRON 4 MILLIGRAM(S): 8 TABLET, FILM COATED ORAL at 17:52

## 2024-05-11 NOTE — ED PROVIDER NOTE - CLINICAL SUMMARY MEDICAL DECISION MAKING FREE TEXT BOX
28-year-old female presents today with generalized viral illness with similar symptoms and kids.  Patient had positive pregnancy test at home however labs in ED indicate negative pregnancy test.  Patient discharged to continue supportive management at home.

## 2024-05-11 NOTE — ED PROVIDER NOTE - OBJECTIVE STATEMENT
28 year old female who presents with nausea/vomiting. patient with +sick contacts @ home with same symptoms. patient also took pregnancy test @ home and reports it was positive. patient requesting repeat pregnancy testing. denies f/c, abd pain, urinary symptoms, back pain.

## 2024-05-11 NOTE — ED PROVIDER NOTE - PATIENT PORTAL LINK FT
You can access the FollowMyHealth Patient Portal offered by [facility] by registering at the following website: [email or web address]. By joining [employer]’s FollowMyHealth portal, you will also be able to view your health information using other applications (apps) compatible with our system.

## 2024-05-11 NOTE — ED ADULT NURSE NOTE - NSFALLUNIVINTERV_ED_ALL_ED
Bed/Stretcher in lowest position, wheels locked, appropriate side rails in place/Call bell, personal items and telephone in reach/Instruct patient to call for assistance before getting out of bed/chair/stretcher/Non-slip footwear applied when patient is off stretcher/[place] to call system/Physically safe environment - no spills, clutter or unnecessary equipment/Purposeful proactive rounding/Room/bathroom lighting operational, light cord in reach

## 2024-05-11 NOTE — ED PROVIDER NOTE - NSFOLLOWUPINSTRUCTIONS_ED_ALL_ED_FT
Please follow up with your primary care doctor in 1-3 days  Please be aware of any new or worsening signs or symptoms that should prompt your return to the ER.      Acute Nausea and Vomiting    WHAT YOU NEED TO KNOW:    Acute nausea and vomiting start suddenly, worsen quickly, and last a short time.    DISCHARGE INSTRUCTIONS:    Return to the emergency department if:     You see blood in your vomit or your bowel movements.      You have sudden, severe pain in your chest and upper abdomen after hard vomiting or retching.      You have swelling in your neck and chest.       You are dizzy, cold, and thirsty and your eyes and mouth are dry.      You are urinating very little or not at all.      You have muscle weakness, leg cramps, and trouble breathing.       Your heart is beating much faster than normal.       You continue to vomit for more than 48 hours.     Contact your healthcare provider if:     You have frequent dry heaves (vomiting but nothing comes out).      Your nausea and vomiting does not get better or go away after you use medicine.      You have questions or concerns about your condition or treatment.    Medicines: You may need any of the following:     Medicines may be given to calm your stomach and stop your vomiting. You may also need medicines to help you feel more relaxed or to stop nausea and vomiting caused by motion sickness.      Gastrointestinal stimulants are used to help empty your stomach and bowels. This may help decrease nausea and vomiting.      Take your medicine as directed. Contact your healthcare provider if you think your medicine is not helping or if you have side effects. Tell him or her if you are allergic to any medicine. Keep a list of the medicines, vitamins, and herbs you take. Include the amounts, and when and why you take them. Bring the list or the pill bottles to follow-up visits. Carry your medicine list with you in case of an emergency.    Prevent or manage acute nausea and vomiting:     Do not drink alcohol. Alcohol may upset or irritate your stomach. Too much alcohol can also cause acute nausea and vomiting.      Control stress. Headaches due to stress may cause nausea and vomiting. Find ways to relax and manage your stress. Get more rest and sleep.      Drink more liquids as directed. Vomiting can lead to dehydration. It is important to drink more liquids to help replace lost body fluids. Ask your healthcare provider how much liquid to drink each day and which liquids are best for you. Your provider may recommend that you drink an oral rehydration solution (ORS). ORS contains water, salts, and sugar that are needed to replace the lost body fluids. Ask what kind of ORS to use, how much to drink, and where to get it.      Eat smaller meals, more often. Eat small amounts of food every 2 to 3 hours, even if you are not hungry. Food in your stomach may decrease your nausea.      Talk to your healthcare provider before you take over-the-counter (OTC) medicines. These medicines can cause serious problems if you use certain other medicines, or you have a medical condition. You may have problems if you use too much or use them for longer than the label says. Follow directions on the label carefully.     Follow up with your healthcare provider as directed: Write down your questions so you remember to ask them during your follow-up visits.       © Copyright [employer] 2019 All illustrations and images included in CareNotes are the copyrighted property of [employer] Inc[employer] or [employer].

## 2024-06-09 ENCOUNTER — EMERGENCY (EMERGENCY)
Facility: HOSPITAL | Age: 29
LOS: 0 days | Discharge: HOME | End: 2024-06-09
Attending: STUDENT IN AN ORGANIZED HEALTH CARE EDUCATION/TRAINING PROGRAM
Payer: MEDICAID

## 2024-06-09 VITALS
OXYGEN SATURATION: 99 % | RESPIRATION RATE: 18 BRPM | TEMPERATURE: 98 F | SYSTOLIC BLOOD PRESSURE: 138 MMHG | DIASTOLIC BLOOD PRESSURE: 90 MMHG | WEIGHT: 134.92 LBS | HEART RATE: 62 BPM

## 2024-06-09 DIAGNOSIS — R10.30 LOWER ABDOMINAL PAIN, UNSPECIFIED: ICD-10-CM

## 2024-06-09 DIAGNOSIS — Z3A.09 9 WEEKS GESTATION OF PREGNANCY: ICD-10-CM

## 2024-06-09 DIAGNOSIS — O20.9 HEMORRHAGE IN EARLY PREGNANCY, UNSPECIFIED: ICD-10-CM

## 2024-06-09 DIAGNOSIS — O26.891 OTHER SPECIFIED PREGNANCY RELATED CONDITIONS, FIRST TRIMESTER: ICD-10-CM

## 2024-06-09 DIAGNOSIS — Z98.890 OTHER SPECIFIED POSTPROCEDURAL STATES: Chronic | ICD-10-CM

## 2024-06-09 LAB
ALBUMIN SERPL ELPH-MCNC: 4.7 G/DL — SIGNIFICANT CHANGE UP (ref 3.5–5.2)
ALP SERPL-CCNC: 56 U/L — SIGNIFICANT CHANGE UP (ref 30–115)
ALT FLD-CCNC: 10 U/L — SIGNIFICANT CHANGE UP (ref 0–41)
ANION GAP SERPL CALC-SCNC: 14 MMOL/L — SIGNIFICANT CHANGE UP (ref 7–14)
APPEARANCE UR: CLEAR — SIGNIFICANT CHANGE UP
AST SERPL-CCNC: 17 U/L — SIGNIFICANT CHANGE UP (ref 0–41)
BACTERIA # UR AUTO: ABNORMAL /HPF
BASOPHILS # BLD AUTO: 0.04 K/UL — SIGNIFICANT CHANGE UP (ref 0–0.2)
BASOPHILS NFR BLD AUTO: 0.4 % — SIGNIFICANT CHANGE UP (ref 0–1)
BILIRUB SERPL-MCNC: 0.3 MG/DL — SIGNIFICANT CHANGE UP (ref 0.2–1.2)
BILIRUB UR-MCNC: NEGATIVE — SIGNIFICANT CHANGE UP
BLD GP AB SCN SERPL QL: SIGNIFICANT CHANGE UP
BUN SERPL-MCNC: 9 MG/DL — LOW (ref 10–20)
CALCIUM SERPL-MCNC: 9.9 MG/DL — SIGNIFICANT CHANGE UP (ref 8.4–10.4)
CAST: 0 /LPF — SIGNIFICANT CHANGE UP (ref 0–4)
CHLORIDE SERPL-SCNC: 102 MMOL/L — SIGNIFICANT CHANGE UP (ref 98–110)
CO2 SERPL-SCNC: 21 MMOL/L — SIGNIFICANT CHANGE UP (ref 17–32)
COLOR SPEC: YELLOW — SIGNIFICANT CHANGE UP
CREAT SERPL-MCNC: 0.5 MG/DL — LOW (ref 0.7–1.5)
DIFF PNL FLD: ABNORMAL
EGFR: 132 ML/MIN/1.73M2 — SIGNIFICANT CHANGE UP
EOSINOPHIL # BLD AUTO: 0.28 K/UL — SIGNIFICANT CHANGE UP (ref 0–0.7)
EOSINOPHIL NFR BLD AUTO: 2.6 % — SIGNIFICANT CHANGE UP (ref 0–8)
GLUCOSE SERPL-MCNC: 84 MG/DL — SIGNIFICANT CHANGE UP (ref 70–99)
GLUCOSE UR QL: NEGATIVE MG/DL — SIGNIFICANT CHANGE UP
HCG SERPL-ACNC: HIGH MIU/ML
HCT VFR BLD CALC: 40.1 % — SIGNIFICANT CHANGE UP (ref 37–47)
HGB BLD-MCNC: 13.8 G/DL — SIGNIFICANT CHANGE UP (ref 12–16)
IMM GRANULOCYTES NFR BLD AUTO: 0.3 % — SIGNIFICANT CHANGE UP (ref 0.1–0.3)
KETONES UR-MCNC: NEGATIVE MG/DL — SIGNIFICANT CHANGE UP
LEUKOCYTE ESTERASE UR-ACNC: ABNORMAL
LYMPHOCYTES # BLD AUTO: 2.99 K/UL — SIGNIFICANT CHANGE UP (ref 1.2–3.4)
LYMPHOCYTES # BLD AUTO: 27.5 % — SIGNIFICANT CHANGE UP (ref 20.5–51.1)
MCHC RBC-ENTMCNC: 30.8 PG — SIGNIFICANT CHANGE UP (ref 27–31)
MCHC RBC-ENTMCNC: 34.4 G/DL — SIGNIFICANT CHANGE UP (ref 32–37)
MCV RBC AUTO: 89.5 FL — SIGNIFICANT CHANGE UP (ref 81–99)
MONOCYTES # BLD AUTO: 0.54 K/UL — SIGNIFICANT CHANGE UP (ref 0.1–0.6)
MONOCYTES NFR BLD AUTO: 5 % — SIGNIFICANT CHANGE UP (ref 1.7–9.3)
NEUTROPHILS # BLD AUTO: 6.98 K/UL — HIGH (ref 1.4–6.5)
NEUTROPHILS NFR BLD AUTO: 64.2 % — SIGNIFICANT CHANGE UP (ref 42.2–75.2)
NITRITE UR-MCNC: NEGATIVE — SIGNIFICANT CHANGE UP
NRBC # BLD: 0 /100 WBCS — SIGNIFICANT CHANGE UP (ref 0–0)
PH UR: 6 — SIGNIFICANT CHANGE UP (ref 5–8)
PLATELET # BLD AUTO: 253 K/UL — SIGNIFICANT CHANGE UP (ref 130–400)
PMV BLD: 11.5 FL — HIGH (ref 7.4–10.4)
POTASSIUM SERPL-MCNC: 4.3 MMOL/L — SIGNIFICANT CHANGE UP (ref 3.5–5)
POTASSIUM SERPL-SCNC: 4.3 MMOL/L — SIGNIFICANT CHANGE UP (ref 3.5–5)
PROT SERPL-MCNC: 7.5 G/DL — SIGNIFICANT CHANGE UP (ref 6–8)
PROT UR-MCNC: NEGATIVE MG/DL — SIGNIFICANT CHANGE UP
RBC # BLD: 4.48 M/UL — SIGNIFICANT CHANGE UP (ref 4.2–5.4)
RBC # FLD: 12.9 % — SIGNIFICANT CHANGE UP (ref 11.5–14.5)
RBC CASTS # UR COMP ASSIST: 18 /HPF — HIGH (ref 0–4)
SODIUM SERPL-SCNC: 137 MMOL/L — SIGNIFICANT CHANGE UP (ref 135–146)
SP GR SPEC: 1.02 — SIGNIFICANT CHANGE UP (ref 1–1.03)
SQUAMOUS # UR AUTO: 2 /HPF — SIGNIFICANT CHANGE UP (ref 0–5)
UROBILINOGEN FLD QL: 0.2 MG/DL — SIGNIFICANT CHANGE UP (ref 0.2–1)
WBC # BLD: 10.86 K/UL — HIGH (ref 4.8–10.8)
WBC # FLD AUTO: 10.86 K/UL — HIGH (ref 4.8–10.8)
WBC UR QL: 23 /HPF — HIGH (ref 0–5)

## 2024-06-09 PROCEDURE — 85025 COMPLETE CBC W/AUTO DIFF WBC: CPT

## 2024-06-09 PROCEDURE — 86900 BLOOD TYPING SEROLOGIC ABO: CPT

## 2024-06-09 PROCEDURE — 76817 TRANSVAGINAL US OBSTETRIC: CPT | Mod: 26

## 2024-06-09 PROCEDURE — 80053 COMPREHEN METABOLIC PANEL: CPT

## 2024-06-09 PROCEDURE — 87086 URINE CULTURE/COLONY COUNT: CPT

## 2024-06-09 PROCEDURE — 76830 TRANSVAGINAL US NON-OB: CPT | Mod: 26

## 2024-06-09 PROCEDURE — 84702 CHORIONIC GONADOTROPIN TEST: CPT

## 2024-06-09 PROCEDURE — 81001 URINALYSIS AUTO W/SCOPE: CPT

## 2024-06-09 PROCEDURE — 99284 EMERGENCY DEPT VISIT MOD MDM: CPT

## 2024-06-09 PROCEDURE — 36415 COLL VENOUS BLD VENIPUNCTURE: CPT

## 2024-06-09 PROCEDURE — 86901 BLOOD TYPING SEROLOGIC RH(D): CPT

## 2024-06-09 PROCEDURE — 76830 TRANSVAGINAL US NON-OB: CPT

## 2024-06-09 PROCEDURE — 99284 EMERGENCY DEPT VISIT MOD MDM: CPT | Mod: 25

## 2024-06-09 PROCEDURE — 86850 RBC ANTIBODY SCREEN: CPT

## 2024-06-09 RX ORDER — CEPHALEXIN 500 MG
1 CAPSULE ORAL
Qty: 20 | Refills: 0
Start: 2024-06-09 | End: 2024-06-13

## 2024-06-09 NOTE — ED PROVIDER NOTE - PATIENT PORTAL LINK FT
You can access the FollowMyHealth Patient Portal offered by [facility] by registering at the following website: [email or web address]. By joining [employer]’s FollowMyHealth portal, you will also be able to view your health information using other applications (apps) compatible with our system.

## 2024-06-09 NOTE — ED PROVIDER NOTE - OBJECTIVE STATEMENT
28 year old female, , currently 9 weeks pregnant, who presents with vaginal bleeding and lower abd cramping that began yesterday. patient reports noticing episode of blood clot and blood upon wiping since with lower abd cramping. denies f/c, vomiting/diarrhea, urinary symptoms. patient follows with GYN in [place], had outpatient US x1 week ago.

## 2024-06-09 NOTE — ED PROVIDER NOTE - PROVIDER TOKENS
FREE:[LAST:[your obgyn],PHONE:[(   )    -],FAX:[(   )    -],FOLLOWUP:[1-3 Days]],PROVIDER:[TOKEN:[[id number]],FOLLOWUP:[1-3 Days]]

## 2024-06-09 NOTE — ED PROVIDER NOTE - NSFOLLOWUPINSTRUCTIONS_ED_ALL_ED_FT
Please follow up with your primary care doctor and your OBGYN in 1-3 days   Please be aware of any new or worsening signs or symptoms that should prompt your return to the ER.      Vaginal Bleeding in Pregnancy: Threatened     A threatened miscarriage occurs when you have vaginal bleeding during your first 20 weeks of pregnancy but the pregnancy has not ended. If you have vaginal bleeding during this time, your health care provider will do tests to make sure you are still pregnant. If the tests show you are still pregnant and the developing baby (fetus) inside your womb (uterus) is still growing, your condition is considered a threatened miscarriage. A threatened miscarriage does not mean your pregnancy will end, but it does increase the risk of losing your pregnancy.    SEEK IMMEDIATE MEDICAL CARE IF YOU HAVE THE FOLLOWING SYMPTOMS: heavy vaginal bleeding, severe low back or abdominal cramps, fever/chills, or lightheadedness/dizziness.

## 2024-06-09 NOTE — ED ADULT NURSE NOTE - OBJECTIVE STATEMENT
28y female c/o bloody/brown vaginal discharge since yesterday night, noted blood clots in urine this morning pt report lower abdominal pain. denies n/v/d/fever/chills. pt aox4 ambulatory baseline.

## 2024-06-09 NOTE — ED PROVIDER NOTE - CARE PROVIDER_API CALL
your obgyn,   Phone: (   )    -  Fax: (   )    -  Follow Up Time: 1-3 Days    Penny Cherry  Internal Medicine  [address] [place] [address]  Phone: [phone]  Fax: ()-  Follow Up Time: 1-3 Days

## 2024-06-09 NOTE — ED PROVIDER NOTE - ATTENDING APP SHARED VISIT CONTRIBUTION OF CARE
I personally evaluated the patient. I reviewed the Resident’s or Physician Assistant’s note (as assigned above), and agree with the findings and plan except as documented in my note.  28 year old female, , currently 9 weeks pregnant, who presents with vaginal bleeding and lower abd cramping that began yesterday. patient reports noticing episode of blood clot and blood upon wiping since with lower abd cramping. denies f/c, vomiting/diarrhea, urinary symptoms. patient follows with GYN in [place], had outpatient US x1 week ago.  CONSTITUTIONAL: well developed; in no acute distress  HEAD: normocephalic; atraumatic  EYES: no conjunctival injection, no scleral icterus  ENT: no nasal discharge; airway clear.  NECK: supple; non tender.  CARD: warm and well perfused, not tachycardic  RESP: breathing comfortably on RA, speaking in full sentences w/o distress  Abdomen: Soft, None Tender, None Distended   EXT: moving all extremities spontaneously, normal ROM.  SKIN: warm and dry, no lesions noted  NEURO: alert, oriented, motor and sensory grossly intact, speech nonslurred  PSYCH: calm, cooperative   done by our PA, os closed   will send labs, ua, TVUS and reevaluate

## 2024-06-09 NOTE — ED PROVIDER NOTE - CLINICAL SUMMARY MEDICAL DECISION MAKING FREE TEXT BOX
28 year old female, , currently 9 weeks pregnant, who presents with vaginal bleeding and lower abd cramping that began yesterday. patient reports noticing episode of blood clot and blood upon wiping since with lower abd cramping. denies f/c, vomiting/diarrhea, urinary symptoms. patient follows with GYN in [place], had outpatient US x1 week ago. exam wnl, only vaginal bleeding with closed os. TVUS showed single IUP with FHR normal. pt has her own ob appointment on Tuesday, pt discharged with ob follow up

## 2024-06-09 NOTE — ED PROVIDER NOTE - PHYSICAL EXAMINATION
CONSTITUTIONAL: non-toxic appearing female, no acute distress  SKIN: skin exam is warm and dry  HEAD: Normocephalic; atraumatic  EYES: PERRL, EOM intact; conjunctiva and sclera clear.  ENT: MMM  NECK: ROM intact  ABD: soft; non-distended; non-tender, no rebound/guarding   EXT: Normal ROM.   NEURO: awake, alert, following commands, oriented, grossly unremarkable. No Focal deficits. GCS 15.   PSYCH: Cooperative, appropriate. CONSTITUTIONAL: non-toxic appearing female, no acute distress  SKIN: skin exam is warm and dry  HEAD: Normocephalic; atraumatic  EYES: PERRL, EOM intact; conjunctiva and sclera clear.  ENT: MMM  NECK: ROM intact  ABD: soft; non-distended; non-tender, no rebound/guarding   : speculum: minimal blood in vault, os closed. bimanual: no cmt, no adnexal tenderness. chaperone: pca lashaun  EXT: Normal ROM.   NEURO: awake, alert, following commands, oriented, grossly unremarkable. No Focal deficits. GCS 15.   PSYCH: Cooperative, appropriate.

## 2024-06-11 LAB
CULTURE RESULTS: SIGNIFICANT CHANGE UP
SPECIMEN SOURCE: SIGNIFICANT CHANGE UP

## 2025-01-17 ENCOUNTER — INPATIENT (INPATIENT)
Facility: HOSPITAL | Age: 30
LOS: 2 days | Discharge: HOME | DRG: 566 | End: 2025-01-20
Attending: OBSTETRICS & GYNECOLOGY | Admitting: OBSTETRICS & GYNECOLOGY
Payer: MEDICAID

## 2025-01-17 VITALS — HEART RATE: 94 BPM | SYSTOLIC BLOOD PRESSURE: 119 MMHG | DIASTOLIC BLOOD PRESSURE: 71 MMHG

## 2025-01-17 DIAGNOSIS — Z98.891 HISTORY OF UTERINE SCAR FROM PREVIOUS SURGERY: Chronic | ICD-10-CM

## 2025-01-17 DIAGNOSIS — O26.899 OTHER SPECIFIED PREGNANCY RELATED CONDITIONS, UNSPECIFIED TRIMESTER: ICD-10-CM

## 2025-01-17 DIAGNOSIS — O42.92 FULL-TERM PREMATURE RUPTURE OF MEMBRANES, UNSPECIFIED AS TO LENGTH OF TIME BETWEEN RUPTURE AND ONSET OF LABOR: ICD-10-CM

## 2025-01-17 DIAGNOSIS — Z98.890 OTHER SPECIFIED POSTPROCEDURAL STATES: Chronic | ICD-10-CM

## 2025-01-17 LAB
ALBUMIN SERPL ELPH-MCNC: 4 G/DL — SIGNIFICANT CHANGE UP (ref 3.5–5.2)
ALP SERPL-CCNC: 201 U/L — HIGH (ref 30–115)
ALT FLD-CCNC: 12 U/L — SIGNIFICANT CHANGE UP (ref 0–41)
AMYLASE P1 CFR SERPL: 41 U/L — SIGNIFICANT CHANGE UP (ref 25–115)
ANION GAP SERPL CALC-SCNC: 14 MMOL/L — SIGNIFICANT CHANGE UP (ref 7–14)
AST SERPL-CCNC: 21 U/L — SIGNIFICANT CHANGE UP (ref 0–41)
BASOPHILS # BLD AUTO: 0.03 K/UL — SIGNIFICANT CHANGE UP (ref 0–0.2)
BASOPHILS NFR BLD AUTO: 0.2 % — SIGNIFICANT CHANGE UP (ref 0–1)
BILIRUB SERPL-MCNC: 1 MG/DL — SIGNIFICANT CHANGE UP (ref 0.2–1.2)
BLD GP AB SCN SERPL QL: SIGNIFICANT CHANGE UP
BUN SERPL-MCNC: 6 MG/DL — LOW (ref 10–20)
CALCIUM SERPL-MCNC: 9 MG/DL — SIGNIFICANT CHANGE UP (ref 8.4–10.5)
CHLORIDE SERPL-SCNC: 102 MMOL/L — SIGNIFICANT CHANGE UP (ref 98–110)
CO2 SERPL-SCNC: 21 MMOL/L — SIGNIFICANT CHANGE UP (ref 17–32)
CREAT SERPL-MCNC: <0.5 MG/DL — LOW (ref 0.7–1.5)
EGFR: 138 ML/MIN/1.73M2 — SIGNIFICANT CHANGE UP
EOSINOPHIL # BLD AUTO: 0 K/UL — SIGNIFICANT CHANGE UP (ref 0–0.7)
EOSINOPHIL NFR BLD AUTO: 0 % — SIGNIFICANT CHANGE UP (ref 0–8)
GLUCOSE SERPL-MCNC: 80 MG/DL — SIGNIFICANT CHANGE UP (ref 70–99)
HCT VFR BLD CALC: 39.3 % — SIGNIFICANT CHANGE UP (ref 37–47)
HGB BLD-MCNC: 12.9 G/DL — SIGNIFICANT CHANGE UP (ref 12–16)
IMM GRANULOCYTES NFR BLD AUTO: 0.4 % — HIGH (ref 0.1–0.3)
LIDOCAIN IGE QN: 24 U/L — SIGNIFICANT CHANGE UP (ref 7–60)
LYMPHOCYTES # BLD AUTO: 1.06 K/UL — LOW (ref 1.2–3.4)
LYMPHOCYTES # BLD AUTO: 8.4 % — LOW (ref 20.5–51.1)
MCHC RBC-ENTMCNC: 28.4 PG — SIGNIFICANT CHANGE UP (ref 27–31)
MCHC RBC-ENTMCNC: 32.8 G/DL — SIGNIFICANT CHANGE UP (ref 32–37)
MCV RBC AUTO: 86.4 FL — SIGNIFICANT CHANGE UP (ref 81–99)
MONOCYTES # BLD AUTO: 0.62 K/UL — HIGH (ref 0.1–0.6)
MONOCYTES NFR BLD AUTO: 4.9 % — SIGNIFICANT CHANGE UP (ref 1.7–9.3)
NEUTROPHILS # BLD AUTO: 10.91 K/UL — HIGH (ref 1.4–6.5)
NEUTROPHILS NFR BLD AUTO: 86.1 % — HIGH (ref 42.2–75.2)
NRBC # BLD: 0 /100 WBCS — SIGNIFICANT CHANGE UP (ref 0–0)
NRBC BLD-RTO: 0 /100 WBCS — SIGNIFICANT CHANGE UP (ref 0–0)
PLATELET # BLD AUTO: 289 K/UL — SIGNIFICANT CHANGE UP (ref 130–400)
PMV BLD: 11.3 FL — HIGH (ref 7.4–10.4)
POTASSIUM SERPL-MCNC: 3.5 MMOL/L — SIGNIFICANT CHANGE UP (ref 3.5–5)
POTASSIUM SERPL-SCNC: 3.5 MMOL/L — SIGNIFICANT CHANGE UP (ref 3.5–5)
PRENATAL SYPHILIS TEST: SIGNIFICANT CHANGE UP
PROT SERPL-MCNC: 6.6 G/DL — SIGNIFICANT CHANGE UP (ref 6–8)
RBC # BLD: 4.55 M/UL — SIGNIFICANT CHANGE UP (ref 4.2–5.4)
RBC # FLD: 12.5 % — SIGNIFICANT CHANGE UP (ref 11.5–14.5)
SODIUM SERPL-SCNC: 137 MMOL/L — SIGNIFICANT CHANGE UP (ref 135–146)
WBC # BLD: 12.67 K/UL — HIGH (ref 4.8–10.8)
WBC # FLD AUTO: 12.67 K/UL — HIGH (ref 4.8–10.8)

## 2025-01-17 PROCEDURE — 36415 COLL VENOUS BLD VENIPUNCTURE: CPT

## 2025-01-17 PROCEDURE — 88307 TISSUE EXAM BY PATHOLOGIST: CPT

## 2025-01-17 PROCEDURE — 88307 TISSUE EXAM BY PATHOLOGIST: CPT | Mod: 26

## 2025-01-17 PROCEDURE — 59025 FETAL NON-STRESS TEST: CPT

## 2025-01-17 PROCEDURE — 86901 BLOOD TYPING SEROLOGIC RH(D): CPT

## 2025-01-17 PROCEDURE — 86900 BLOOD TYPING SEROLOGIC ABO: CPT

## 2025-01-17 PROCEDURE — 85025 COMPLETE CBC W/AUTO DIFF WBC: CPT

## 2025-01-17 PROCEDURE — 86850 RBC ANTIBODY SCREEN: CPT

## 2025-01-17 PROCEDURE — 86592 SYPHILIS TEST NON-TREP QUAL: CPT

## 2025-01-17 RX ORDER — AZITHROMYCIN DIHYDRATE 500 MG/1
500 TABLET, FILM COATED ORAL ONCE
Refills: 0 | Status: COMPLETED | OUTPATIENT
Start: 2025-01-17 | End: 2025-01-17

## 2025-01-17 RX ORDER — IBUPROFEN 600 MG/1
600 TABLET, FILM COATED ORAL EVERY 6 HOURS
Refills: 0 | Status: COMPLETED | OUTPATIENT
Start: 2025-01-17 | End: 2025-12-16

## 2025-01-17 RX ORDER — CEFTRIAXONE 250 MG/1
1000 INJECTION, POWDER, FOR SOLUTION INTRAMUSCULAR; INTRAVENOUS EVERY 24 HOURS
Refills: 0 | Status: DISCONTINUED | OUTPATIENT
Start: 2025-01-17 | End: 2025-01-17

## 2025-01-17 RX ORDER — ONDANSETRON 4 MG/1
4 TABLET, ORALLY DISINTEGRATING ORAL EVERY 6 HOURS
Refills: 0 | Status: DISCONTINUED | OUTPATIENT
Start: 2025-01-17 | End: 2025-01-18

## 2025-01-17 RX ORDER — SODIUM CHLORIDE 9 G/ML
1000 INJECTION, SOLUTION INTRAVENOUS
Refills: 0 | Status: DISCONTINUED | OUTPATIENT
Start: 2025-01-17 | End: 2025-01-20

## 2025-01-17 RX ORDER — CLOSTRIDIUM TETANI TOXOID ANTIGEN (FORMALDEHYDE INACTIVATED), CORYNEBACTERIUM DIPHTHERIAE TOXOID ANTIGEN (FORMALDEHYDE INACTIVATED), BORDETELLA PERTUSSIS TOXOID ANTIGEN (GLUTARALDEHYDE INACTIVATED), BORDETELLA PERTUSSIS FILAMENTOUS HEMAGGLUTININ ANTIGEN (FORMALDEHYDE INACTIVATED), BORDETELLA PERTUSSIS PERTACTIN ANTIGEN, AND BORDETELLA PERTUSSIS FIMBRIAE 2/3 ANTIGEN 5; 2; 2.5; 5; 3; 5 [LF]/.5ML; [LF]/.5ML; UG/.5ML; UG/.5ML; UG/.5ML; UG/.5ML
0.5 INJECTION, SUSPENSION INTRAMUSCULAR ONCE
Refills: 0 | Status: DISCONTINUED | OUTPATIENT
Start: 2025-01-17 | End: 2025-01-20

## 2025-01-17 RX ORDER — ACETAMINOPHEN 160 MG/5ML
975 SUSPENSION ORAL
Refills: 0 | Status: DISCONTINUED | OUTPATIENT
Start: 2025-01-17 | End: 2025-01-20

## 2025-01-17 RX ORDER — AMOXICILLIN AND CLAVULANATE POTASSIUM 200; 28.5 MG/5ML; MG/5ML
1 POWDER, FOR SUSPENSION ORAL EVERY 12 HOURS
Refills: 0 | Status: DISCONTINUED | OUTPATIENT
Start: 2025-01-18 | End: 2025-01-20

## 2025-01-17 RX ORDER — OXYCODONE HYDROCHLORIDE 30 MG/1
5 TABLET ORAL ONCE
Refills: 0 | Status: DISCONTINUED | OUTPATIENT
Start: 2025-01-17 | End: 2025-01-20

## 2025-01-17 RX ORDER — SODIUM CHLORIDE 9 G/ML
1000 INJECTION, SOLUTION INTRAVENOUS
Refills: 0 | Status: DISCONTINUED | OUTPATIENT
Start: 2025-01-17 | End: 2025-01-17

## 2025-01-17 RX ORDER — DIPHENHYDRAMINE HCL 25 MG
25 CAPSULE ORAL EVERY 6 HOURS
Refills: 0 | Status: DISCONTINUED | OUTPATIENT
Start: 2025-01-17 | End: 2025-01-20

## 2025-01-17 RX ORDER — MAGNESIUM HYDROXIDE 400 MG/5ML
30 SUSPENSION, ORAL (FINAL DOSE FORM) ORAL
Refills: 0 | Status: DISCONTINUED | OUTPATIENT
Start: 2025-01-17 | End: 2025-01-20

## 2025-01-17 RX ORDER — CEFAZOLIN SODIUM IN 0.9 % NACL 2 G/10 ML
2000 SYRINGE (ML) INTRAVENOUS ONCE
Refills: 0 | Status: COMPLETED | OUTPATIENT
Start: 2025-01-17 | End: 2025-01-17

## 2025-01-17 RX ORDER — KETOROLAC TROMETHAMINE 10 MG
30 TABLET ORAL EVERY 6 HOURS
Refills: 0 | Status: COMPLETED | OUTPATIENT
Start: 2025-01-17 | End: 2025-01-18

## 2025-01-17 RX ORDER — SENNOSIDES 8.6 MG
2 TABLET ORAL AT BEDTIME
Refills: 0 | Status: DISCONTINUED | OUTPATIENT
Start: 2025-01-17 | End: 2025-01-18

## 2025-01-17 RX ORDER — OXYCODONE HYDROCHLORIDE 30 MG/1
5 TABLET ORAL
Refills: 0 | Status: COMPLETED | OUTPATIENT
Start: 2025-01-17 | End: 2025-01-24

## 2025-01-17 RX ORDER — ENOXAPARIN SODIUM 100 MG/ML
40 INJECTION SUBCUTANEOUS EVERY 24 HOURS
Refills: 0 | Status: DISCONTINUED | OUTPATIENT
Start: 2025-01-18 | End: 2025-01-20

## 2025-01-17 RX ORDER — OXYTOCIN/0.9 % SODIUM CHLORIDE 10/500ML
167 PLASTIC BAG, INJECTION (ML) INTRAVENOUS
Qty: 30 | Refills: 0 | Status: DISCONTINUED | OUTPATIENT
Start: 2025-01-17 | End: 2025-01-20

## 2025-01-17 RX ADMIN — CEFTRIAXONE 100 MILLIGRAM(S): 250 INJECTION, POWDER, FOR SOLUTION INTRAMUSCULAR; INTRAVENOUS at 14:48

## 2025-01-17 RX ADMIN — Medication 100 MILLIGRAM(S): at 20:59

## 2025-01-17 RX ADMIN — Medication 30 MILLIGRAM(S): at 22:39

## 2025-01-17 RX ADMIN — AZITHROMYCIN DIHYDRATE 255 MILLIGRAM(S): 500 TABLET, FILM COATED ORAL at 21:15

## 2025-01-17 NOTE — OB RN DELIVERY SUMMARY - NS_TIMERUPTUREDADMITTED_OBGYN_ALL_OB_FT
continue ASA 81 mg po qd   Brilinta 90 me po BID  Patient having CP today, will get EKG, send cardiac enzymes. continue ASA 81 mg po qd   Brilinta 90 me po BID  Patient having CP today, ECG w/o ischemic changes, trop and CPK normal.  Consider antacid. 0 Minute(s)

## 2025-01-17 NOTE — OB PROVIDER TRIAGE NOTE - NSHPLABSRESULTS_GEN_ALL_CORE
NIPT low risk male    Fetal ECHO WNL  Sono @ 12w5d NT WNL                                                                                                                                                                                                                                                                                                                         Sono @ 20.4w S=D post placenta, nl anatomy, Subserous ant fibroid [id number]            Sono @ 32.1wks S=D, vtx, post placenta, EFW: 2109, 60%, BPP 8/8  Sono @ 34.1wks, S=D, vtx, post placenta, EFW 2614g, 63%    1/3/2025  RPR NR  HIV NR

## 2025-01-17 NOTE — OB PROVIDER H&P - ASSESSMENT
29y.o.  @ 38.5wks for repeat  in labor, gastroenteritis, UTI  Admit to L&D  IVF, labs  Continuous EFM and toco  SCD's  Bee catheter  Abdominal prep  Ancef prior to OR  On call to OR  Dr. Kerns Aware

## 2025-01-17 NOTE — OB PROVIDER DELIVERY SUMMARY - NSSELHIDDEN_OBGYN_ALL_OB_FT
[NS_DeliveryAttending1_OBGYN_ALL_OB_FT:[id number]],[NS_DeliveryAssist1_OBGYN_ALL_OB_FT:[id number]],[NS_DeliveryRN_OBGYN_ALL_OB_FT:[id number]]

## 2025-01-17 NOTE — OB PROVIDER TRIAGE NOTE - NSOBPROVIDERNOTE_OBGYN_ALL_OB_FT
29y.o.  @ 38.5wks with gastroenteritis, +UTI, prior  x 1  - Observation in L&D  - IV hydration  - CBC. CMP  - Rocephin x 1 dose now

## 2025-01-17 NOTE — BRIEF OPERATIVE NOTE - FIRST ASSIST NAME
TRANSFER - IN REPORT: 
 
Verbal report received from Humberto RN(name) on Nick West  being received from Orth[facility] Room 3221(unit) for ordered procedure Report consisted of patients Situation, Background, Assessment and  
Recommendations(SBAR). Information from the following report(s) SBAR, Kardex, Intake/Output, MAR, Recent Results and Procedure Verification was reviewed with the receiving nurse. Opportunity for questions and clarification was provided. Assessment completed upon patients arrival to unit and care assumed.
Mariangel Rodrigez (Resident)
Mariangel Rodrigez (Resident)

## 2025-01-17 NOTE — OB RN INTRAOPERATIVE NOTE - NS_DELIVERYRN_OBGYN_ALL_OB_FT
[facility]    PROGRESS NOTE    Patient:  Everett Mejía    :  1998    Date of Service:  21    The encounter diagnosis was Schizophrenia, unspecified type (CMS/HCC).    Data: Pt reports symptoms have improved a bit although paranoia is persistent. Pt reports some days he is more able to push thoughts to the side. Pt engaged in discussing strategies to manage anxious and paranoid thoughts. Pt denies concerns.     Intervention:  Cognitive Behavioral Strategies and Insight Oriented Strategies    Patient continues to be involved in service planning:  YES    Describe above interventions:  Writer provided supportive listening, interactive feedback, and validation of feelings throughout session. Writer assessed changes in symptoms and how pt has been coping. Writer attempted to aid pt in identifying triggers to worsening symptoms to increase insight. Writer provided stress bucket example. Writer engaged pt in challenging thoughts.     Patient's response to interventions:  Pt reports some paranoia about parents whispering and during video games at baseline. Pt states he can sometimes more easily push these thoughts to the side depending on his mood. Pt mostly unable to endorse triggers, although agrees that poor sleep can impact symptoms. Pt engaged in discussing strategies for minimizing anxiety symptoms such as deep breathing and practiced challenging and reframing thoughts.     Continue to support patient's efforts and progress towards established treatment plan goals in the following ways:  Ongoing FEP    Off-site:  No   This visit is being performed virtually.  Clinician Location: Office  Everett's Location: Home   30 mins spent on encounter    Rex Orellana - MEI

## 2025-01-17 NOTE — OB PROVIDER TRIAGE NOTE - NS_ABDFINDING_OBGYN_ALL_OB
[facility] Nurse Msg Pool  Stelara 90mg/ml   - Pending Provider Response       Rx Insurance: [employer]       Provider portion is missing Tax id# , state license# [place]# and PTAN. Faxed providers portion to providers office to have this completed. Please return the form so we can submit for assistance.       12/1/23      Soft, nontender

## 2025-01-17 NOTE — OB PROVIDER H&P - NSHPLABSRESULTS_GEN_ALL_CORE
NIPT low risk male    Fetal ECHO WNL    Sono @ 12w5d NT WNL                                                                                                                                                                                                                                                                                                                       Sono @ 20.4w S=D post placenta, nl anatomy, Subserous ant fibroid [id number]            Sono @ 32.1wks S=D, vtx, post placenta, EFW: 2109, 60%, BPP 8/8  Sono @ 34.1wks, S=D, vtx, post placenta, EFW 2614g, 63%

## 2025-01-17 NOTE — OB RN DELIVERY SUMMARY - NSSELHIDDEN_OBGYN_ALL_OB_FT
[NS_DeliveryAttending1_OBGYN_ALL_OB_FT:[id number]=],[NS_DeliveryAssist1_OBGYN_ALL_OB_FT:[id number]=],[NS_DeliveryRN_OBGYN_ALL_OB_FT:[id number]=]

## 2025-01-17 NOTE — OB RN INTRAOPERATIVE NOTE - NS_POSTSURGSKIN_OBGYN_ALL_OB
Intact 1) liberalize diet to low sodium 2) Ensure enlive shake 8oz po BID 3) MVI w/ minerals to meet RDI's 4) monitor daily weights 5) monitor labs/lytes replete 6) encourage pt to order meals for food preferences and optimal intake.

## 2025-01-17 NOTE — OB PROVIDER DELIVERY SUMMARY - NSLOWPPHRISK_OBGYN_A_OB
[place] Pregnancy/Less than or equal to 4 previous vaginal births/No known bleeding disorder/No history of postpartum hemorrhage

## 2025-01-17 NOTE — OB RN DELIVERY SUMMARY - NS_CORDVESSELS_OBGYN_ALL_OB
FAMILY HISTORY:  Mother  Still living? Unknown  Family history of hypertension in mother, Age at diagnosis: Age Unknown    
3

## 2025-01-17 NOTE — OB RN INTRAOPERATIVE NOTE - NS_ELECTROPADLOC_OBGYN_ALL_OB
This is a telephone visit.  The patient is interviewed after she gave her verbal consent.    HPI:     The patient reported that she has been feeling \"pretty good\".      at the time of evaluation she  denied having neurovegetative symptoms depression.  Does  report having  difficulty in maintaining concentration and she has been forgetful.     Denies having symptoms consistent with pravin/hypomania.     The patient has been taking her psychotropic medications as directed and she denies having adverse drug reactions.   When asked about substance use the patient denies drinking  alcohol or using any recreational  Drugs.  she said he still smokes, but she has decreased smoking tobacco  from 1ppd to 1/3 ppd.    As far as her her current major stressors the patient reported she lost her aunt and a close friend, 2 dogs and 1 bird  during the pandemic .  She is able to cope with the grieving uneventfully. She said she has adopted a new dog for companionship.   The patient moved from [place]  to [place] in the spring.  She is living in apartment complex.  She said she likes the new living department which is also  also cost-effective.   In terms of her past psychiatric history the patient reported that she  started psychiatric treatment in her early 20s.  Initially the patient was diagnosed with a major depressive disorder by a primary care physician.    She was started Zoloft for \"depression\", then BuSpar for \"anxiety\".  In the summer 2013 the patient seemed to have a manic episode, in which she was hyperactive, restless, with the grandiosity and impulsivity.  The patient disclosed that she exhausted almost $300,000 of asset inherited from her parents.  In August 2013 the patient was admitted to [facility] for management of mood instability with suicidality.  The patient was diagnosed with a bipolar disorder.  After the inpatient treatment the patient started outpatient psychiatric treatment at [facility]  health department.  The patient reported that she tried  multiple psychotropic medications prescribed by  psychiatrists affiliated with [facility].  She remembers that she used to take lithium  but the medication caused sedation.  For the past 2 years she has been placed on Depakote 500 mg twice daily;  Wellbutrin from 150 mg daily to 300 mg daily and clonazepam 0.5 mg 3 times a day as needed.      Past  medical history:  Significant for hypertension and hyperlipidemia.  The patient used to take antihypertensive agent and anti-hyperlipidemic agent through [facility], but she said she stopped both medications.        Current Outpatient Prescriptions:   Current Outpatient Medications   Medication Sig Dispense Refill   • buPROPion (WELLBUTRIN SR) 150 MG 12 hr tablet TAKE 1 TABLET BY MOUTH DAILY 30 tablet 0   • clonazePAM (KlonoPIN) 0.5 MG tablet Take 1 tablet by mouth 2 times daily as needed for Anxiety. 60 tablet 0   • divalproex (DEPAKOTE ER) 500 MG 24 hr ER tablet TAKE 1 TABLET BY MOUTH TWICE DAILY WITH MEALS 180 tablet 0     No current facility-administered medications for this visit.         Psychosocial history:  The patient was raising intact family.  She denies  having history of psychological trauma.  She said he used to work at [employer] For 30 years but  she was fired in the summer  after she was admitted to [facility].  The patient reported that she developed a major depressive episode in  after she lost both parents.  Her father  from MI and her  mother  from non-Hodgkin's lymphoma.  The patient disclosed that she inherited an asset of $300,000 from her parents but she exhausted the fund and she also lost her house due to mismanagement when she was in manic episode.  In  the patient was placed on SSDI based on diagnosis of a bipolar disorder.  Currently she is a living by herself with one dog.   She is getting Medicare and she  gets pension from [employer].  She has a sister  who is living in [place].  the patient reported that she has not contacted her sister since 1992.  The patient reported that she started smoking cigarettes in her early 20s and she has been smoking 1 pack per day for 40 years.  She used to smoke marijuana for many years to cope with mood instability.  Since she was admitted to [facility] in 2013 she stopped using cannabis.  She denies ever drinking  Alcohol.  The patient reported that her mother had \"depression\" and her mother used to take diazepam        Mental status exam:    The patient interviewed through the telephone.  She seemed to be calm and pleasant, and she is well related and engaged in conversation .  Speech is coherent and articulate  Mood is \"pretty good\"  Affect is calm and smiling  Thought process is sequential  Thought content is significant for concerning about the surge of  COVID-19 pandemic.  There is no perceptual disturbance.  Cognitive Functioning: Grossly intact  She showed good insight and judgment     Assessment:  Axis I: Bipolar disorder type I, most recent episode depressed  Tobacco use disorder     Treatment  plan:  #1.  Bipolar disorder, most recent episode depressed, improved  Continue  Depakote 500 mg twice a day   Continue wellbutrin  150 mg daily.   Continue clonazepam  0.5 mg qhs and 0.5 mg qd prn.     #2.  Tobacco use disorder, failing to change as expected   discussed with patient about health risk associated with cigarette smoking and she is encouraged to stop smoking  We discussed the treatment options for smoking cessation    During session the patient encouraged her to express her feelings concern by providing empathetic listening.  The potential coping mechanism is explored    I discussed with the patient about the primary diagnosis of a bipolar disorder type I, so-called manic depressive disorder, the management of bipolar disorder type I,  including  pharmacotherapy and psychotherapy. Discussed the psychopharmacology of Clonazepam, risk of dependence, and cognitive impairment associated with taking Benzo.       I discussed with the patient about psychopharmacology of Depakote, bupropion and benzodiazepine, clinic indications of taking her psychotropic medications, the potential side effects and  risk of misuse, dependency and risk of cognitive impairment associated with taking benzodiazepine.  The patient is encouraged  to take her psychotropic medication as directed and she is scheduled to return to clinic in 6 months  for follow-up.      25 minutes face-to-face with the patient, in counseling, discussion of her diagnosis, management, progression and prognosis    Electronically signed by  Aureliano Engle MD, Certified by the American Board of Psychiatry and Neurology        Right thigh

## 2025-01-17 NOTE — BRIEF OPERATIVE NOTE - OPERATION/FINDINGS
Pfannenstiel incision made. Viable male infant delivered in vertex position, APGARs 9/9, weighing 3770gms. Nl uterus, tubes and ovaries.

## 2025-01-17 NOTE — OB RN PATIENT PROFILE - FALL HARM RISK - UNIVERSAL INTERVENTIONS
Bed in lowest position, wheels locked, appropriate side rails in place/Call bell, personal items and telephone in reach/Instruct patient to call for assistance before getting out of bed or chair/Non-slip footwear when patient is out of bed/[place] to call system/Physically safe environment - no spills, clutter or unnecessary equipment/Purposeful Proactive Rounding/Room/bathroom lighting operational, light cord in reach

## 2025-01-17 NOTE — OB RN INTRAOPERATIVE NOTE - NSSELHIDDEN_OBGYN_ALL_OB_FT
Duplicate message   [NS_DeliveryAttending1_OBGYN_ALL_OB_FT:[id number]],[NS_DeliveryAssist1_OBGYN_ALL_OB_FT:[id number]],[NS_DeliveryRN_OBGYN_ALL_OB_FT:[id number]=]

## 2025-01-17 NOTE — OB PROVIDER H&P - NSHPPHYSICALEXAM_GEN_ALL_CORE
T(C): 37.4 (01-17-25 @ 14:50), Max: 37.4 (01-17-25 @ 14:50)  HR: 94 (01-17-25 @ 14:31) (94 - 94)  BP: 119/71 (01-17-25 @ 14:31) (119/71 - 119/71)  RR: 22 (01-17-25 @ 14:50) (20 - 22)  SpO2: --    ABd: gravid, soft, NT, no incisional tenderness  VE: 2/50/-1/post  CTx: q 5-6 min  FHR: 150 moderate variability, +accels, Variable decel x 2

## 2025-01-17 NOTE — OB RN PATIENT PROFILE - NS_MODEOFARRIVAL_OBGYN_ALL_OB
Per protocol faxed ins card, office note, demographics and order to dr jesse huffman at [facility] dermatology. Confirmed fax rec'vd.   Car

## 2025-01-17 NOTE — OB PROVIDER TRIAGE NOTE - NS_OBGYNHISTORY_OBGYN_ALL_OB_FT
FT  6lbs no comp   FT  8lb3oz  no comp  SAB @ 17wks w/ D&E   FT  8lb9oz Suspected macrosomia    Denies STI, PID, abnl PAP, fibroids, cysts

## 2025-01-17 NOTE — OB PROVIDER TRIAGE NOTE - NS_FETALMOVEMENT_OBGYN_ALL_OB
Present, unchanged
Additional Notes: Clinical photo was taken today for biopsy purpose.
Detail Level: Zone

## 2025-01-17 NOTE — OB RN PATIENT PROFILE - VISION (WITH CORRECTIVE LENSES IF THE PATIENT USUALLY WEARS THEM):
Airway  Performed by: Sincere Benitez MD  Authorized by: Sincere Benitez MD     Final Airway Type:  Endotracheal airway  Final Endotracheal Airway*:  ETT  ETT Size (mm)*:  7.0  Cuff*:  Regular  Technique Used for Successful ETT Placement:  Direct laryngoscopy  Devices/Methods Used in Placement*:  Mask  Intubation Procedure*:  Preoxygenation, ETCO2, Atraumatic, Dentition Unchanged and Pharynx Clear  Insertion Site:  Oral  Blade Type*:  MAC  Blade Size*:  3  Measured from*:  Lips  Secured at (cm)*:  20  Placement Verified by: auscultation and capnometry    Glottic View*:  2 - partial view of glottis  Attempts*:  1   Patient Identified, Procedure confirmed, Emergency equipment available and Safety protocols followed  Location:  OR  Urgency:  Elective  Difficult Airway: No    Indications for Airway Management:  Anesthesia  Spontaneous Ventilation: absent    Sedation Level:  Anesthetized  Mask Difficulty Assessment:  1 - vent by mask  Performed By:  Anesthesiologist  Anesthesiologist:  Sincere Benitez MD  Start Time: 2/15/2022 8:09 AM        
Normal vision: sees adequately in most situations; can see medication labels, newsprint

## 2025-01-17 NOTE — OB PROVIDER H&P - HISTORY OF PRESENT ILLNESS
Pt is a 29y.o.  @ 38w5d dated by 1st trimester sonogram presents c/o multiple episodes of vomiting since yesterday AM, last episode at 0900, unable to tolerate fluids or food. Pt also has c/o diarrhea, last episode 1100. Pts children were diagnosed with Norovirus earlier this week. Pt c/o lower abdominal cramping, coming and going. Pt was started on Amoxicillin 2 days ago for UTI which she has not been able to tolerate. Pt reports burning on urination and frequency. Pt denies LOF, VB and reports good FM.     Pt s/p observation and IV hydration with continued ctx. Decision made for repeat  tonight     H/o  x 2, FT then  x 1 for suspected macrosomia. Pt scheduled for repeat

## 2025-01-17 NOTE — OB PROVIDER TRIAGE NOTE - NSHPPHYSICALEXAM_GEN_ALL_CORE
ABd: gravid, soft, NT, no incisional tenderness, neg CVA tenderness b/l  VE:   CTx: q 5-6 min  FHR: 140 moderate variability, Reactive NST ABd: gravid, soft, NT, no incisional tenderness, neg CVA tenderness b/l  VE: 2/50/-1/soft/post  CTx: q 5-6 min  FHR: 140 moderate variability, Reactive NST

## 2025-01-18 LAB
BASOPHILS # BLD AUTO: 0.02 K/UL — SIGNIFICANT CHANGE UP (ref 0–0.2)
BASOPHILS NFR BLD AUTO: 0.1 % — SIGNIFICANT CHANGE UP (ref 0–1)
EOSINOPHIL # BLD AUTO: 0.03 K/UL — SIGNIFICANT CHANGE UP (ref 0–0.7)
EOSINOPHIL NFR BLD AUTO: 0.2 % — SIGNIFICANT CHANGE UP (ref 0–8)
HCT VFR BLD CALC: 31.2 % — LOW (ref 37–47)
HGB BLD-MCNC: 10.3 G/DL — LOW (ref 12–16)
IMM GRANULOCYTES NFR BLD AUTO: 0.3 % — SIGNIFICANT CHANGE UP (ref 0.1–0.3)
LYMPHOCYTES # BLD AUTO: 1.99 K/UL — SIGNIFICANT CHANGE UP (ref 1.2–3.4)
LYMPHOCYTES # BLD AUTO: 13.3 % — LOW (ref 20.5–51.1)
MCHC RBC-ENTMCNC: 28.7 PG — SIGNIFICANT CHANGE UP (ref 27–31)
MCHC RBC-ENTMCNC: 33 G/DL — SIGNIFICANT CHANGE UP (ref 32–37)
MCV RBC AUTO: 86.9 FL — SIGNIFICANT CHANGE UP (ref 81–99)
MONOCYTES # BLD AUTO: 1.02 K/UL — HIGH (ref 0.1–0.6)
MONOCYTES NFR BLD AUTO: 6.8 % — SIGNIFICANT CHANGE UP (ref 1.7–9.3)
NEUTROPHILS # BLD AUTO: 11.85 K/UL — HIGH (ref 1.4–6.5)
NEUTROPHILS NFR BLD AUTO: 79.3 % — HIGH (ref 42.2–75.2)
NRBC # BLD: 0 /100 WBCS — SIGNIFICANT CHANGE UP (ref 0–0)
NRBC BLD-RTO: 0 /100 WBCS — SIGNIFICANT CHANGE UP (ref 0–0)
PLATELET # BLD AUTO: 252 K/UL — SIGNIFICANT CHANGE UP (ref 130–400)
PMV BLD: 11.3 FL — HIGH (ref 7.4–10.4)
RBC # BLD: 3.59 M/UL — LOW (ref 4.2–5.4)
RBC # FLD: 12.4 % — SIGNIFICANT CHANGE UP (ref 11.5–14.5)
WBC # BLD: 14.96 K/UL — HIGH (ref 4.8–10.8)
WBC # FLD AUTO: 14.96 K/UL — HIGH (ref 4.8–10.8)

## 2025-01-18 RX ORDER — IBUPROFEN 600 MG/1
600 TABLET, FILM COATED ORAL EVERY 6 HOURS
Refills: 0 | Status: DISCONTINUED | OUTPATIENT
Start: 2025-01-18 | End: 2025-01-20

## 2025-01-18 RX ADMIN — AMOXICILLIN AND CLAVULANATE POTASSIUM 1 TABLET(S): 200; 28.5 POWDER, FOR SUSPENSION ORAL at 06:31

## 2025-01-18 RX ADMIN — IBUPROFEN 600 MILLIGRAM(S): 600 TABLET, FILM COATED ORAL at 19:20

## 2025-01-18 RX ADMIN — Medication 30 MILLIGRAM(S): at 06:31

## 2025-01-18 RX ADMIN — ENOXAPARIN SODIUM 40 MILLIGRAM(S): 100 INJECTION SUBCUTANEOUS at 09:18

## 2025-01-18 RX ADMIN — Medication 30 MILLIGRAM(S): at 11:08

## 2025-01-18 RX ADMIN — ACETAMINOPHEN 975 MILLIGRAM(S): 160 SUSPENSION ORAL at 22:41

## 2025-01-18 RX ADMIN — ACETAMINOPHEN 975 MILLIGRAM(S): 160 SUSPENSION ORAL at 23:40

## 2025-01-18 RX ADMIN — AMOXICILLIN AND CLAVULANATE POTASSIUM 1 TABLET(S): 200; 28.5 POWDER, FOR SUSPENSION ORAL at 18:26

## 2025-01-18 RX ADMIN — ACETAMINOPHEN 975 MILLIGRAM(S): 160 SUSPENSION ORAL at 15:30

## 2025-01-18 RX ADMIN — ACETAMINOPHEN 975 MILLIGRAM(S): 160 SUSPENSION ORAL at 08:15

## 2025-01-18 RX ADMIN — IBUPROFEN 600 MILLIGRAM(S): 600 TABLET, FILM COATED ORAL at 18:26

## 2025-01-18 RX ADMIN — ACETAMINOPHEN 975 MILLIGRAM(S): 160 SUSPENSION ORAL at 08:45

## 2025-01-18 RX ADMIN — Medication 30 MILLIGRAM(S): at 12:08

## 2025-01-18 RX ADMIN — ACETAMINOPHEN 975 MILLIGRAM(S): 160 SUSPENSION ORAL at 14:18

## 2025-01-19 RX ORDER — ONDANSETRON 4 MG/1
4 TABLET, ORALLY DISINTEGRATING ORAL ONCE
Refills: 0 | Status: COMPLETED | OUTPATIENT
Start: 2025-01-19 | End: 2025-01-19

## 2025-01-19 RX ORDER — OXYCODONE HYDROCHLORIDE 30 MG/1
5 TABLET ORAL
Refills: 0 | Status: DISCONTINUED | OUTPATIENT
Start: 2025-01-19 | End: 2025-01-20

## 2025-01-19 RX ORDER — OXYCODONE HYDROCHLORIDE 30 MG/1
5 TABLET ORAL ONCE
Refills: 0 | Status: DISCONTINUED | OUTPATIENT
Start: 2025-01-19 | End: 2025-01-19

## 2025-01-19 RX ADMIN — ENOXAPARIN SODIUM 40 MILLIGRAM(S): 100 INJECTION SUBCUTANEOUS at 12:19

## 2025-01-19 RX ADMIN — Medication 80 MILLIGRAM(S): at 17:22

## 2025-01-19 RX ADMIN — IBUPROFEN 600 MILLIGRAM(S): 600 TABLET, FILM COATED ORAL at 06:49

## 2025-01-19 RX ADMIN — AMOXICILLIN AND CLAVULANATE POTASSIUM 1 TABLET(S): 200; 28.5 POWDER, FOR SUSPENSION ORAL at 06:49

## 2025-01-19 RX ADMIN — ACETAMINOPHEN 975 MILLIGRAM(S): 160 SUSPENSION ORAL at 16:21

## 2025-01-19 RX ADMIN — ACETAMINOPHEN 975 MILLIGRAM(S): 160 SUSPENSION ORAL at 15:51

## 2025-01-19 RX ADMIN — IBUPROFEN 600 MILLIGRAM(S): 600 TABLET, FILM COATED ORAL at 12:19

## 2025-01-19 RX ADMIN — OXYCODONE HYDROCHLORIDE 5 MILLIGRAM(S): 30 TABLET ORAL at 21:27

## 2025-01-19 RX ADMIN — OXYCODONE HYDROCHLORIDE 5 MILLIGRAM(S): 30 TABLET ORAL at 17:22

## 2025-01-19 RX ADMIN — IBUPROFEN 600 MILLIGRAM(S): 600 TABLET, FILM COATED ORAL at 12:49

## 2025-01-19 RX ADMIN — ACETAMINOPHEN 975 MILLIGRAM(S): 160 SUSPENSION ORAL at 09:50

## 2025-01-19 RX ADMIN — AMOXICILLIN AND CLAVULANATE POTASSIUM 1 TABLET(S): 200; 28.5 POWDER, FOR SUSPENSION ORAL at 17:22

## 2025-01-19 RX ADMIN — OXYCODONE HYDROCHLORIDE 5 MILLIGRAM(S): 30 TABLET ORAL at 00:00

## 2025-01-19 RX ADMIN — ACETAMINOPHEN 975 MILLIGRAM(S): 160 SUSPENSION ORAL at 09:20

## 2025-01-19 RX ADMIN — ONDANSETRON 4 MILLIGRAM(S): 4 TABLET, ORALLY DISINTEGRATING ORAL at 21:09

## 2025-01-20 VITALS
RESPIRATION RATE: 18 BRPM | TEMPERATURE: 98 F | SYSTOLIC BLOOD PRESSURE: 113 MMHG | DIASTOLIC BLOOD PRESSURE: 71 MMHG | OXYGEN SATURATION: 97 % | HEART RATE: 74 BPM

## 2025-01-20 RX ORDER — IBUPROFEN 600 MG/1
1 TABLET, FILM COATED ORAL
Qty: 0 | Refills: 0 | DISCHARGE
Start: 2025-01-20

## 2025-01-20 RX ORDER — ACETAMINOPHEN 160 MG/5ML
2 SUSPENSION ORAL
Qty: 0 | Refills: 0 | DISCHARGE
Start: 2025-01-20

## 2025-01-20 RX ADMIN — IBUPROFEN 600 MILLIGRAM(S): 600 TABLET, FILM COATED ORAL at 12:33

## 2025-01-20 RX ADMIN — ACETAMINOPHEN 975 MILLIGRAM(S): 160 SUSPENSION ORAL at 03:40

## 2025-01-20 RX ADMIN — IBUPROFEN 600 MILLIGRAM(S): 600 TABLET, FILM COATED ORAL at 06:16

## 2025-01-20 RX ADMIN — ACETAMINOPHEN 975 MILLIGRAM(S): 160 SUSPENSION ORAL at 10:20

## 2025-01-20 RX ADMIN — ACETAMINOPHEN 975 MILLIGRAM(S): 160 SUSPENSION ORAL at 03:11

## 2025-01-20 RX ADMIN — ENOXAPARIN SODIUM 40 MILLIGRAM(S): 100 INJECTION SUBCUTANEOUS at 10:50

## 2025-01-20 RX ADMIN — IBUPROFEN 600 MILLIGRAM(S): 600 TABLET, FILM COATED ORAL at 06:33

## 2025-01-20 RX ADMIN — ACETAMINOPHEN 975 MILLIGRAM(S): 160 SUSPENSION ORAL at 09:21

## 2025-01-20 RX ADMIN — Medication 80 MILLIGRAM(S): at 09:21

## 2025-01-20 RX ADMIN — IBUPROFEN 600 MILLIGRAM(S): 600 TABLET, FILM COATED ORAL at 00:30

## 2025-01-20 RX ADMIN — AMOXICILLIN AND CLAVULANATE POTASSIUM 1 TABLET(S): 200; 28.5 POWDER, FOR SUSPENSION ORAL at 06:15

## 2025-01-20 RX ADMIN — IBUPROFEN 600 MILLIGRAM(S): 600 TABLET, FILM COATED ORAL at 00:00

## 2025-01-20 NOTE — PROGRESS NOTE ADULT - SUBJECTIVE AND OBJECTIVE BOX
PGY3 note  Chief Complaint: Post  section    Patient seen and examined. Patient complaining of increased pain. Denies fever, chills, nausea, vomiting, chest pain, shortness of breath, severe abdominal pain, heavy vaginal bleeding.     Patient is ambulating.   Passing flatus, Denies bowel movement.   Diet: Regular, tolerating PO  Voiding without difficulty, no dysuria     PAST MEDICAL & SURGICAL HISTORY:  No pertinent past medical history  History of D&C  History of  section    MEDICATIONS  (STANDING):  acetaminophen     Tablet .. 975 milliGRAM(s) Oral <User Schedule>  amoxicillin  500 milliGRAM(s)/clavulanate 1 Tablet(s) Oral every 12 hours  diphtheria/tetanus/pertussis (acellular) Vaccine (Adacel) 0.5 milliLiter(s) IntraMuscular once  enoxaparin Injectable 40 milliGRAM(s) SubCutaneous every 24 hours  ibuprofen  Tablet. 600 milliGRAM(s) Oral every 6 hours  lactated ringers. 1000 milliLiter(s) (125 mL/Hr) IV Continuous <Continuous>  oxytocin Infusion 167 milliUNIT(s)/Min (167 mL/Hr) IV Continuous <Continuous>    Physical Exam  Vital Signs Last 24 Hrs  T(F): 97.6 (2025 07:30), Max: 97.7 (2025 19:04)  HR: 70 (2025 07:30) (65 - 76)  BP: 101/62 (2025 07:30) (94/60 - 114/69)  RR: 18 (2025 07:30) (16 - 18)    Physical exam:  General - AAOx3, NAD  Heart - S1S2, RRR  Lungs - CTA BL  Abdomen:  - Soft, appropriately tender, mildly distended, BS+. Clean, dry, intact steri strips in place over pfannenstiel skin incision.  - Fundus firm, appropriately tender, below the umbilicus  - No rebound or guarding  Pelvis/Vagina - Normal Lochia  Extremities - No calf tenderness, no swelling    Labs:                        10.3   14.96 )-----------( 252      ( 2025 11:07 )             31.2                         12.9   12.67 )-----------( 289      ( 2025 14:25 )             39.3     Antibody Screen: NEG (25 @ 17:00)      Antibody Screen: NEG (25 @ 17:00)    
PGY1 NOTE  Chief Complaint: s/p  section    HPI: No overnight events, no acute complaints.   Pt doing well, explains pain is well controlled. Pt has been up to the chair, sanches in place draining clear yellow urine, passing gas, and tolerating regular diet without difficulty, explains she has not had any nausea or vomiting. Pt is breastfeeding without issue.   Denies HA, lightheadedness, palpitations, N/V, fevers, chills, CP, SOB, LE edema, heavy vaginal bleeding.     PAST MEDICAL & SURGICAL HISTORY:  No pertinent past medical history  History of D&C  History of  section    Physical Exam  Vital Signs Last 24 Hrs  T(F): 97.8 (2025 07:15), Max: 99.32 (2025 14:50)  HR: 74 (2025 07:15) (74 - 118)  BP: 92/60 (2025 07:15) (92/50 - 119/71)  RR: 18 (2025 07:15) (18 - 22)    Physical exam:  General - AAOx3, NAD  Heart - S1S2, RRR  Lungs - CTA BL. Breathing unlabored. Speaking in clear complete sentences.   Abdomen:  - Soft, nontender, mildly distended, BS+  - Fundus firm, nontender, below the umbilicus  Incision - Clean, dry, intact steri-strips in place over pfannenstiel skin incision.  Pelvis/Vagina - Normal rubra lochia  Extremities - No calf tenderness, no swelling bilaterally.     Labs:                        12.9   12.67 )-----------( 289      ( 2025 14:25 )             39.3     Antibody Screen: NEG (25 @ 17:00)    Prenatal Syphilis Test: Nonreact (25 @ 17:00)      acetaminophen     Tablet .. 975 milliGRAM(s) Oral <User Schedule>, Routine  amoxicillin  500 milliGRAM(s)/clavulanate 1 Tablet(s) Oral every 12 hours, 06:00  diphenhydrAMINE 25 milliGRAM(s) Oral every 6 hours, Routine PRN Pruritus  diphtheria/tetanus/pertussis (acellular) Vaccine (Adacel) 0.5 milliLiter(s) IntraMuscular once, Now  enoxaparin Injectable 40 milliGRAM(s) SubCutaneous every 24 hours, 10:45  ibuprofen  Tablet. 600 milliGRAM(s) Oral every 6 hours, Routine  ketorolac   Injectable 30 milliGRAM(s) IV Push every 6 hours, Routine  lactated ringers. 1000 milliLiter(s) IV Continuous <Continuous>, Routine  lanolin Ointment 1 Application(s) Topical every 6 hours, Routine PRN Sore Nipples  magnesium hydroxide Suspension 30 milliLiter(s) Oral two times a day, Routine PRN Constipation  oxyCODONE    IR 5 milliGRAM(s) Oral every 3 hours, Routine PRN Moderate to Severe Pain (4-10)  oxyCODONE    IR 5 milliGRAM(s) Oral once, Routine PRN Moderate to Severe Pain (4-10)  oxytocin Infusion 167 milliUNIT(s)/Min IV Continuous <Continuous>, Routine  simethicone 80 milliGRAM(s) Chew every 4 hours, Routine PRN Gas  
Patient with vomiting and diarrhea followed by continuous contractions not resolved with hydration   cat 2 fhr    patient refusing tolac  will proceed with  section
Discharge note  patient feels well   passing flatus and voiding   afebrile  fundus firm non tender  incision clean and dry  extr 1+edema   no calf tenderness   will d/c today f/u office 2 weeks
PGY1 NOTE  Chief Complaint: s/p  section    HPI: No overnight events, no acute complaints.   Pt doing well, explains pain is now well controlled. Pt has been ambulating, voiding, passing gas, and tolerating regular diet without difficulty. Pt is breastfeeding without issue.   Denies HA, lightheadedness, palpitations, N/V, fevers, chills, CP, SOB, LE edema, heavy vaginal bleeding.     PAST MEDICAL & SURGICAL HISTORY:  No pertinent past medical history  History of D&C  History of  section    Physical Exam  Vital Signs Last 24 Hrs  T(F): 98.5 (2025 03:25), Max: 98.5 (2025 03:25)  HR: 94 (2025 03:25) (70 - 94)  BP: 120/74 (2025 03:25) (100/64 - 120/74)  RR: 18 (2025 03:25) (18 - 18)    Physical exam:  General - AAOx3, NAD  Heart - S1S2, RRR  Lungs - CTA BL. Breathing unlabored. Speaking in clear complete sentences.   Abdomen:  - Soft, nontender, mildly distended, BS+  - Fundus firm, nontender, below the umbilicus  Incision - Clean, dry, intact steri-strips in place over pfannenstiel skin incision.  Pelvis/Vagina - Normal rubra lochia  Extremities - No calf tenderness, no swelling bilaterally.     Labs:                        10.3   14.96 )-----------( 252      ( 2025 11:07 )             31.2                         12.9   12.67 )-----------( 289      ( 2025 14:25 )             39.3     Antibody Screen: NEG (25 @ 17:00)    acetaminophen     Tablet .. 975 milliGRAM(s) Oral <User Schedule>, Routine  amoxicillin  500 milliGRAM(s)/clavulanate 1 Tablet(s) Oral every 12 hours, 06:00  diphenhydrAMINE 25 milliGRAM(s) Oral every 6 hours, Routine PRN Pruritus  diphtheria/tetanus/pertussis (acellular) Vaccine (Adacel) 0.5 milliLiter(s) IntraMuscular once, Now  enoxaparin Injectable 40 milliGRAM(s) SubCutaneous every 24 hours, 10:45  ibuprofen  Tablet. 600 milliGRAM(s) Oral every 6 hours, Routine  lactated ringers. 1000 milliLiter(s) IV Continuous <Continuous>, Routine  lanolin Ointment 1 Application(s) Topical every 6 hours, Routine PRN Sore Nipples  magnesium hydroxide Suspension 30 milliLiter(s) Oral two times a day, Routine PRN Constipation  oxyCODONE    IR 5 milliGRAM(s) Oral every 3 hours, Routine PRN Moderate to Severe Pain (4-10)  oxyCODONE    IR 5 milliGRAM(s) Oral once, Routine PRN Moderate to Severe Pain (4-10)  oxytocin Infusion 167 milliUNIT(s)/Min IV Continuous <Continuous>, Routine  simethicone 80 milliGRAM(s) Chew every 4 hours, Routine PRN Gas    
PGY1 note  Chief Complaint: Post  section    Patient seen and examined. Pain well controlled at this time. No complaints at this time. Denies fever, chills, nausea, vomiting, chest pain, shortness of breath, severe abdominal pain, heavy vaginal bleeding. Diet: Regular, tolerating PO  Voiding: sanches catheter in place    PAST MEDICAL & SURGICAL HISTORY:  No pertinent past medical history      History of D&C      History of  section          MEDICATIONS  (STANDING):  acetaminophen     Tablet .. 975 milliGRAM(s) Oral <User Schedule>  amoxicillin  500 milliGRAM(s)/clavulanate 1 Tablet(s) Oral every 12 hours  diphtheria/tetanus/pertussis (acellular) Vaccine (Adacel) 0.5 milliLiter(s) IntraMuscular once  enoxaparin Injectable 40 milliGRAM(s) SubCutaneous every 24 hours  ibuprofen  Tablet. 600 milliGRAM(s) Oral every 6 hours  ketorolac   Injectable 30 milliGRAM(s) IV Push every 6 hours  lactated ringers. 1000 milliLiter(s) (125 mL/Hr) IV Continuous <Continuous>  oxytocin Infusion 167 milliUNIT(s)/Min (167 mL/Hr) IV Continuous <Continuous>    MEDICATIONS  (PRN):  diphenhydrAMINE 25 milliGRAM(s) Oral every 6 hours PRN Pruritus  lanolin Ointment 1 Application(s) Topical every 6 hours PRN Sore Nipples  magnesium hydroxide Suspension 30 milliLiter(s) Oral two times a day PRN Constipation  oxyCODONE    IR 5 milliGRAM(s) Oral every 3 hours PRN Moderate to Severe Pain (4-10)  oxyCODONE    IR 5 milliGRAM(s) Oral once PRN Moderate to Severe Pain (4-10)  simethicone 80 milliGRAM(s) Chew every 4 hours PRN Gas      Physical Exam  Vital Signs Last 24 Hrs  T(F): 98.3 (2025 00:49), Max: 99.32 (2025 14:50)  HR: 91 (2025 00:49) (81 - 118)  BP: 107/62 (2025 00:49) (92/50 - 119/71)  RR: 18 (2025 00:49) (18 - 22)    Physical exam:  General - AAOx3, NAD  Heart - S1S2, RRR  Lungs - CTA BL  Abdomen:  - Soft, appropriately tender, mildly distended, BS+. Clean, dry, dressing in place over pfannenstiel skin incision.  - Fundus firm, appropriately tender, below the umbilicus  - No rebound or guarding  Pelvis/Vagina - Normal Lochia  Extremities - No calf tenderness, no swelling    Labs:                        12.9   12.67 )-----------( 289      ( 2025 14:25 )             39.3     Antibody Screen: NEG (25 @ 17:00)    Prenatal Syphilis Test: Nonreact (25 @ 17:00)    Antibody Screen: NEG (25 @ 17:00)      A/P   Antibody Screen: NEG (25 @ 17:00)    
POD#2  afebrile  feels well   abd soft   fundus firm   incision clean and dry  hb 12 to 10 gm  plan to d/c tomorrow am

## 2025-01-20 NOTE — PROGRESS NOTE ADULT - REASON FOR ADMISSION
38 weeks previous cs in labor
38 weeks previous cs in labor
38 weeks cat 2 fhr early labor previous  section x1 refused tolac
C/s

## 2025-01-20 NOTE — DISCHARGE NOTE OB - PATIENT PORTAL LINK FT
You can access the FollowMyHealth Patient Portal offered by [facility] by registering at the following website: [email or web address]. By joining [facility]’s FollowMyHealth portal, you will also be able to view your health information using other applications (apps) compatible with our system.

## 2025-01-20 NOTE — DISCHARGE NOTE OB - MEDICATION SUMMARY - MEDICATIONS TO TAKE
I will START or STAY ON the medications listed below when I get home from the hospital:    ibuprofen 600 mg oral tablet  -- 1 tab(s) by mouth every 6 hours  -- Indication: For pain     acetaminophen 325 mg oral tablet  -- 2 tab(s) by mouth 3 times a day  -- Indication: For pain

## 2025-01-20 NOTE — DISCHARGE NOTE OB - FINANCIAL ASSISTANCE
[facility] provides services at a reduced cost to those who are determined to be eligible through [facility]’s financial assistance program. Information regarding [facility]’s financial assistance program can be found by going to [email or web address] or by calling [phone].

## 2025-01-20 NOTE — PROGRESS NOTE ADULT - ASSESSMENT
28yo now P4 s/p  section #2, repeat in labor, POD #1, stable.    -  Pain control per routine  -  encourage ambulation  -  encourage incentive spirometry  -  PO hydration  -  Continue diet as tolerated   -  sanches in situ; DC this AM, f/u TOV  -  DVT prophylaxis: ambulation, SCDs, Lovenox  -  simethicone  -  f/u post op cbc
30yo now P4 s/p  section #2, repeat in labor, POD #3, recovering appropriately.    -Monitor vitals  -f/u norovirus swab  -Pain management PRN  -Encourage ambulation  -Incentive spirometry  -PO hydration  -DVT ppx: Lovenox, SCDs  -Anticipate d/c home today with instructions to f/u with OBGYN in 1 week for incision check and in 6 weeks for postpartum visit  
a/p: 30yo now P4 s/p  section #2, repeat in labor, POD #1, recovering appropriately.    -f/u 1100 CBC  -f/u norovirus swab  -Monitor vitals  -Pain management PRN  -Encourage ambulation  -Incentive spirometry  -PO hydration  -DVT ppx: Lovenox, SCDs  -pull verónica, f/u TOV   
a/p: 30yo now P4 s/p  section #2, repeat in labor, POD #2, recovering appropriately.    -f/u norovirus swab  -Monitor vitals  -Pain management PRN  -Encourage ambulation  -Incentive spirometry  -PO hydration  -DVT ppx: Lovenox, DAGOBERTOs

## 2025-01-20 NOTE — DISCHARGE NOTE OB - CARE PROVIDER_API CALL
Tomer Kerns  Obstetrics and Gynecology  [address] [place] [id number]  Phone: [phone]  Fax: [phone]  Follow Up Time:

## 2025-01-27 DIAGNOSIS — Z3A.38 38 WEEKS GESTATION OF PREGNANCY: ICD-10-CM

## 2025-01-27 DIAGNOSIS — A08.11 ACUTE GASTROENTEROPATHY DUE TO NORWALK AGENT: ICD-10-CM

## 2025-01-27 DIAGNOSIS — O34.211 MATERNAL CARE FOR LOW TRANSVERSE SCAR FROM PREVIOUS CESAREAN DELIVERY: ICD-10-CM

## 2025-01-27 DIAGNOSIS — Z91.199 PATIENT'S NONCOMPLIANCE WITH OTHER MEDICAL TREATMENT AND REGIMEN DUE TO UNSPECIFIED REASON: ICD-10-CM

## 2025-01-27 DIAGNOSIS — O23.43 UNSPECIFIED INFECTION OF URINARY TRACT IN PREGNANCY, THIRD TRIMESTER: ICD-10-CM

## 2025-02-04 LAB — SURGICAL PATHOLOGY STUDY: SIGNIFICANT CHANGE UP

## (undated) RX ORDER — TOBRAMYCIN AND DEXAMETHASONE 3; 1 MG/G; MG/G: 1/2 OINTMENT OPHTHALMIC AS NEEDED

## (undated) RX ORDER — CIPROFLOXACIN 3 MG/ML: 1 SOLUTION OPHTHALMIC

## (undated) RX ORDER — PREDNISOLONE ACETATE 10 MG/ML: 1 SUSPENSION/ DROPS OPHTHALMIC